# Patient Record
Sex: MALE | Race: WHITE | Employment: FULL TIME | URBAN - METROPOLITAN AREA
[De-identification: names, ages, dates, MRNs, and addresses within clinical notes are randomized per-mention and may not be internally consistent; named-entity substitution may affect disease eponyms.]

---

## 2020-08-20 ENCOUNTER — OFFICE VISIT (OUTPATIENT)
Dept: ORTHOPEDIC SURGERY | Age: 40
End: 2020-08-20
Payer: COMMERCIAL

## 2020-08-20 VITALS — BODY MASS INDEX: 34.65 KG/M2 | WEIGHT: 270 LBS | HEIGHT: 74 IN

## 2020-08-20 PROCEDURE — 99243 OFF/OP CNSLTJ NEW/EST LOW 30: CPT | Performed by: ORTHOPAEDIC SURGERY

## 2020-08-20 PROCEDURE — L1810 KO ELASTIC WITH JOINTS: HCPCS | Performed by: ORTHOPAEDIC SURGERY

## 2020-08-20 NOTE — PROGRESS NOTES
induration. Vascular: Examination reveals no swelling or calf tenderness. Peripheral pulses are palpable and 2+. Neurological: The patient has good coordination. There is no weakness or sensory deficit. Right Knee Examination:    Inspection: Today's inspection of the right lower extremity reveals the skin to be intact with vascular changes with ropey varicosities noted. There is no present joint effusion noted. Palpation: Patient is minimally tender to palpation over the fibular head into the lateral joint line. There is minimal palpable pain noted over the medial or patellofemoral joint on the right knee. Range of Motion: Range of motion is noted to be 0 degrees of extension to at least 120 degrees of flexion. Strength: -5/5 in his quadriceps and hamstrings on the right. Special Tests: Negative Gabriel with a slight limp favoring her right lower extremity    Skin: There are no rashes, ulcerations or lesions. Gait: Minimal limp favoring the right lower extremity    Additional Examinations:         Left Lower Extremity: Examination of the left lower extremity does not show any tenderness, deformity or injury. Range of motion is unremarkable. There is no gross instability. There are no rashes, ulcerations or lesions. Strength and tone are normal.    Radiology:     X-rays obtained and reviewed in office:  Views 4 views to include AP, lateral, tunnel view, sunrise view right knee  Location right knee  Impression there are no acute fractures noted and minimal degenerative changes noted within the medial or lateral compartment. There is minimal right patellofemoral pain. Assessment : Contusion right knee    Impression:  Encounter Diagnoses   Name Primary?     Right knee pain, unspecified chronicity     Contusion of right knee, initial encounter Yes    History of disruption of posterior cruciate ligament        Office Procedures:  Orders Placed This Encounter   Procedures    XR

## 2020-08-20 NOTE — LETTER
[place] [address] [address] [facility] [address]  Phone: [phone]  Fax: [phone]    Quan Renee MD        August 20, 2020     Patient: Ruthann De Luna   YOB: 1980   Date of Visit: 8/20/2020       To Whom It May Concern: It is my medical opinion that Constance Natarajan should remain out of work until 8/28/2020 due to an orthopedic injury. If you have any questions or concerns, please don't hesitate to call.     Sincerely,    8/20/2020 9:55 AM    Quan Renee MD

## 2024-03-05 ENCOUNTER — APPOINTMENT (OUTPATIENT)
Dept: CT IMAGING | Age: 44
End: 2024-03-05
Payer: COMMERCIAL

## 2024-03-05 ENCOUNTER — HOSPITAL ENCOUNTER (INPATIENT)
Age: 44
LOS: 2 days | End: 2024-03-08
Attending: EMERGENCY MEDICINE | Admitting: HOSPITALIST
Payer: COMMERCIAL

## 2024-03-05 ENCOUNTER — ANCILLARY PROCEDURE (OUTPATIENT)
Dept: EMERGENCY DEPT | Age: 44
End: 2024-03-05
Attending: EMERGENCY MEDICINE
Payer: COMMERCIAL

## 2024-03-05 ENCOUNTER — APPOINTMENT (OUTPATIENT)
Dept: GENERAL RADIOLOGY | Age: 44
End: 2024-03-05
Payer: COMMERCIAL

## 2024-03-05 DIAGNOSIS — E87.20 LACTIC ACIDOSIS: ICD-10-CM

## 2024-03-05 DIAGNOSIS — J96.01 ACUTE RESPIRATORY FAILURE WITH HYPOXIA (HCC): ICD-10-CM

## 2024-03-05 DIAGNOSIS — F10.10 ALCOHOL ABUSE: ICD-10-CM

## 2024-03-05 DIAGNOSIS — I46.9 CARDIAC ARREST (HCC): Primary | ICD-10-CM

## 2024-03-05 DIAGNOSIS — S00.01XA ABRASION OF SCALP, INITIAL ENCOUNTER: ICD-10-CM

## 2024-03-05 DIAGNOSIS — S22.43XA MULTIPLE FRACTURES OF RIBS, BILATERAL, INITIAL ENCOUNTER FOR CLOSED FRACTURE: ICD-10-CM

## 2024-03-05 LAB
ALBUMIN SERPL-MCNC: 3.6 G/DL (ref 3.4–5)
ALBUMIN/GLOB SERPL: 1.2 {RATIO} (ref 1.1–2.2)
ALP SERPL-CCNC: 78 U/L (ref 40–129)
ALT SERPL-CCNC: 31 U/L (ref 10–40)
ANION GAP SERPL CALCULATED.3IONS-SCNC: 19 MMOL/L (ref 3–16)
ANISOCYTOSIS BLD QL SMEAR: ABNORMAL
AST SERPL-CCNC: 51 U/L (ref 15–37)
BASE EXCESS BLDV CALC-SCNC: -11.2 MMOL/L (ref -3–3)
BASE EXCESS BLDV CALC-SCNC: -20.8 MMOL/L (ref -3–3)
BASOPHILS # BLD: 0 K/UL (ref 0–0.2)
BASOPHILS # BLD: 0 K/UL (ref 0–0.2)
BASOPHILS NFR BLD: 0 %
BASOPHILS NFR BLD: 0.4 %
BILIRUB SERPL-MCNC: 1.1 MG/DL (ref 0–1)
BUN SERPL-MCNC: 14 MG/DL (ref 7–20)
CALCIUM SERPL-MCNC: 8.2 MG/DL (ref 8.3–10.6)
CHLORIDE SERPL-SCNC: 90 MMOL/L (ref 99–110)
CO2 BLDV-SCNC: 18 MMOL/L
CO2 BLDV-SCNC: 18 MMOL/L
CO2 SERPL-SCNC: 20 MMOL/L (ref 21–32)
COHGB MFR BLDV: 4.8 % (ref 0–1.5)
COHGB MFR BLDV: 8.1 % (ref 0–1.5)
CREAT SERPL-MCNC: 1.2 MG/DL (ref 0.9–1.3)
DEPRECATED RDW RBC AUTO: 14 % (ref 12.4–15.4)
DEPRECATED RDW RBC AUTO: 14.2 % (ref 12.4–15.4)
EOSINOPHIL # BLD: 0.1 K/UL (ref 0–0.6)
EOSINOPHIL # BLD: 0.4 K/UL (ref 0–0.6)
EOSINOPHIL NFR BLD: 0.9 %
EOSINOPHIL NFR BLD: 3 %
ETHANOLAMINE SERPL-MCNC: 237 MG/DL (ref 0–0.08)
GFR SERPLBLD CREATININE-BSD FMLA CKD-EPI: >60 ML/MIN/{1.73_M2}
GLUCOSE SERPL-MCNC: 232 MG/DL (ref 70–99)
HCO3 BLDV-SCNC: 15.4 MMOL/L (ref 23–29)
HCO3 BLDV-SCNC: 17 MMOL/L (ref 23–29)
HCT VFR BLD AUTO: 47 % (ref 40.5–52.5)
HCT VFR BLD AUTO: 47 % (ref 40.5–52.5)
HGB BLD-MCNC: 15.4 G/DL (ref 13.5–17.5)
HGB BLD-MCNC: 15.7 G/DL (ref 13.5–17.5)
INR PPP: 1.17 (ref 0.84–1.16)
LACTATE BLDV-SCNC: 9.2 MMOL/L (ref 0.4–2)
LYMPHOCYTES # BLD: 1.3 K/UL (ref 1–5.1)
LYMPHOCYTES # BLD: 6.2 K/UL (ref 1–5.1)
LYMPHOCYTES NFR BLD: 10.7 %
LYMPHOCYTES NFR BLD: 25 %
MACROCYTES BLD QL SMEAR: ABNORMAL
MAGNESIUM SERPL-MCNC: 3 MG/DL (ref 1.8–2.4)
MCH RBC QN AUTO: 32.7 PG (ref 26–34)
MCH RBC QN AUTO: 32.8 PG (ref 26–34)
MCHC RBC AUTO-ENTMCNC: 32.6 G/DL (ref 31–36)
MCHC RBC AUTO-ENTMCNC: 33.4 G/DL (ref 31–36)
MCV RBC AUTO: 100.6 FL (ref 80–100)
MCV RBC AUTO: 97.9 FL (ref 80–100)
METHGB MFR BLDV: 0.1 %
METHGB MFR BLDV: 0.2 %
MICROCYTES BLD QL SMEAR: ABNORMAL
MONOCYTES # BLD: 0.7 K/UL (ref 0–1.3)
MONOCYTES # BLD: 1 K/UL (ref 0–1.3)
MONOCYTES NFR BLD: 6.3 %
MONOCYTES NFR BLD: 8 %
NEUTROPHILS # BLD: 5.1 K/UL (ref 1.7–7.7)
NEUTROPHILS # BLD: 9.6 K/UL (ref 1.7–7.7)
NEUTROPHILS NFR BLD: 36 %
NEUTROPHILS NFR BLD: 81.7 %
NEUTS BAND NFR BLD MANUAL: 4 % (ref 0–7)
NEUTS VAC BLD QL SMEAR: PRESENT
NT-PROBNP SERPL-MCNC: 99 PG/ML (ref 0–124)
O2 THERAPY: ABNORMAL
O2 THERAPY: ABNORMAL
OVALOCYTES BLD QL SMEAR: ABNORMAL
PATH INTERP BLD-IMP: YES
PCO2 BLDV: 46.1 MMHG (ref 40–50)
PCO2 BLDV: 92.9 MMHG (ref 40–50)
PH BLDV: 6.84 [PH] (ref 7.35–7.45)
PH BLDV: 7.18 [PH] (ref 7.35–7.45)
PLATELET # BLD AUTO: 131 K/UL (ref 135–450)
PLATELET # BLD AUTO: 139 K/UL (ref 135–450)
PLATELET BLD QL SMEAR: ABNORMAL
PMV BLD AUTO: 9.6 FL (ref 5–10.5)
PMV BLD AUTO: 9.8 FL (ref 5–10.5)
PO2 BLDV: 69.2 MMHG (ref 25–40)
PO2 BLDV: 88.4 MMHG (ref 25–40)
POIKILOCYTOSIS BLD QL SMEAR: ABNORMAL
POLYCHROMASIA BLD QL SMEAR: ABNORMAL
POTASSIUM SERPL-SCNC: 3.5 MMOL/L (ref 3.5–5.1)
PROT SERPL-MCNC: 6.5 G/DL (ref 6.4–8.2)
PROTHROMBIN TIME: 14.9 SEC (ref 11.5–14.8)
RBC # BLD AUTO: 4.68 M/UL (ref 4.2–5.9)
RBC # BLD AUTO: 4.81 M/UL (ref 4.2–5.9)
SAO2 % BLDV: 80 %
SAO2 % BLDV: 95 %
SLIDE REVIEW: ABNORMAL
SODIUM SERPL-SCNC: 129 MMOL/L (ref 136–145)
SPHEROCYTES BLD QL SMEAR: ABNORMAL
TROPONIN, HIGH SENSITIVITY: 13 NG/L (ref 0–22)
TROPONIN, HIGH SENSITIVITY: <6 NG/L (ref 0–22)
VARIANT LYMPHS NFR BLD MANUAL: 24 % (ref 0–6)
WBC # BLD AUTO: 11.7 K/UL (ref 4–11)
WBC # BLD AUTO: 12.7 K/UL (ref 4–11)

## 2024-03-05 PROCEDURE — 76937 US GUIDE VASCULAR ACCESS: CPT

## 2024-03-05 PROCEDURE — 74177 CT ABD & PELVIS W/CONTRAST: CPT

## 2024-03-05 PROCEDURE — 85610 PROTHROMBIN TIME: CPT

## 2024-03-05 PROCEDURE — 80053 COMPREHEN METABOLIC PANEL: CPT

## 2024-03-05 PROCEDURE — 6360000004 HC RX CONTRAST MEDICATION: Performed by: EMERGENCY MEDICINE

## 2024-03-05 PROCEDURE — 2500000003 HC RX 250 WO HCPCS: Performed by: EMERGENCY MEDICINE

## 2024-03-05 PROCEDURE — 96375 TX/PRO/DX INJ NEW DRUG ADDON: CPT

## 2024-03-05 PROCEDURE — 96361 HYDRATE IV INFUSION ADD-ON: CPT

## 2024-03-05 PROCEDURE — 82803 BLOOD GASES ANY COMBINATION: CPT

## 2024-03-05 PROCEDURE — 36556 INSERT NON-TUNNEL CV CATH: CPT | Performed by: NURSE PRACTITIONER

## 2024-03-05 PROCEDURE — 80307 DRUG TEST PRSMV CHEM ANLYZR: CPT

## 2024-03-05 PROCEDURE — 0BH17EZ INSERTION OF ENDOTRACHEAL AIRWAY INTO TRACHEA, VIA NATURAL OR ARTIFICIAL OPENING: ICD-10-PCS | Performed by: INTERNAL MEDICINE

## 2024-03-05 PROCEDURE — 71045 X-RAY EXAM CHEST 1 VIEW: CPT

## 2024-03-05 PROCEDURE — 83880 ASSAY OF NATRIURETIC PEPTIDE: CPT

## 2024-03-05 PROCEDURE — 84484 ASSAY OF TROPONIN QUANT: CPT

## 2024-03-05 PROCEDURE — 36556 INSERT NON-TUNNEL CV CATH: CPT

## 2024-03-05 PROCEDURE — 94761 N-INVAS EAR/PLS OXIMETRY MLT: CPT

## 2024-03-05 PROCEDURE — 6360000002 HC RX W HCPCS: Performed by: EMERGENCY MEDICINE

## 2024-03-05 PROCEDURE — 72125 CT NECK SPINE W/O DYE: CPT

## 2024-03-05 PROCEDURE — 85025 COMPLETE CBC W/AUTO DIFF WBC: CPT

## 2024-03-05 PROCEDURE — 96376 TX/PRO/DX INJ SAME DRUG ADON: CPT

## 2024-03-05 PROCEDURE — 82077 ASSAY SPEC XCP UR&BREATH IA: CPT

## 2024-03-05 PROCEDURE — 92950 HEART/LUNG RESUSCITATION CPR: CPT

## 2024-03-05 PROCEDURE — 96374 THER/PROPH/DIAG INJ IV PUSH: CPT

## 2024-03-05 PROCEDURE — 02HV33Z INSERTION OF INFUSION DEVICE INTO SUPERIOR VENA CAVA, PERCUTANEOUS APPROACH: ICD-10-PCS | Performed by: INTERNAL MEDICINE

## 2024-03-05 PROCEDURE — 31500 INSERT EMERGENCY AIRWAY: CPT

## 2024-03-05 PROCEDURE — 99285 EMERGENCY DEPT VISIT HI MDM: CPT

## 2024-03-05 PROCEDURE — 2580000003 HC RX 258: Performed by: EMERGENCY MEDICINE

## 2024-03-05 PROCEDURE — 83605 ASSAY OF LACTIC ACID: CPT

## 2024-03-05 PROCEDURE — 93005 ELECTROCARDIOGRAM TRACING: CPT | Performed by: EMERGENCY MEDICINE

## 2024-03-05 PROCEDURE — 83735 ASSAY OF MAGNESIUM: CPT

## 2024-03-05 PROCEDURE — 70450 CT HEAD/BRAIN W/O DYE: CPT

## 2024-03-05 PROCEDURE — 5A1945Z RESPIRATORY VENTILATION, 24-96 CONSECUTIVE HOURS: ICD-10-PCS | Performed by: INTERNAL MEDICINE

## 2024-03-05 PROCEDURE — 71260 CT THORAX DX C+: CPT

## 2024-03-05 PROCEDURE — 2700000000 HC OXYGEN THERAPY PER DAY

## 2024-03-05 RX ORDER — MIDAZOLAM HYDROCHLORIDE 1 MG/ML
2 INJECTION INTRAMUSCULAR; INTRAVENOUS ONCE
Status: COMPLETED | OUTPATIENT
Start: 2024-03-05 | End: 2024-03-05

## 2024-03-05 RX ORDER — 0.9 % SODIUM CHLORIDE 0.9 %
1000 INTRAVENOUS SOLUTION INTRAVENOUS ONCE
Status: COMPLETED | OUTPATIENT
Start: 2024-03-05 | End: 2024-03-06

## 2024-03-05 RX ORDER — 0.9 % SODIUM CHLORIDE 0.9 %
1000 INTRAVENOUS SOLUTION INTRAVENOUS ONCE
Status: COMPLETED | OUTPATIENT
Start: 2024-03-05 | End: 2024-03-05

## 2024-03-05 RX ORDER — FENTANYL CITRATE 50 UG/ML
50 INJECTION, SOLUTION INTRAMUSCULAR; INTRAVENOUS ONCE
Status: COMPLETED | OUTPATIENT
Start: 2024-03-05 | End: 2024-03-06

## 2024-03-05 RX ORDER — FENTANYL CITRATE 50 UG/ML
50 INJECTION, SOLUTION INTRAMUSCULAR; INTRAVENOUS ONCE
Status: COMPLETED | OUTPATIENT
Start: 2024-03-05 | End: 2024-03-05

## 2024-03-05 RX ORDER — EPINEPHRINE IN SOD CHLOR,ISO 1 MG/10 ML
SYRINGE (ML) INTRAVENOUS DAILY PRN
Status: COMPLETED | OUTPATIENT
Start: 2024-03-05 | End: 2024-03-05

## 2024-03-05 RX ADMIN — SODIUM CHLORIDE 1000 ML: 9 INJECTION, SOLUTION INTRAVENOUS at 22:55

## 2024-03-05 RX ADMIN — IOPAMIDOL 75 ML: 755 INJECTION, SOLUTION INTRAVENOUS at 21:56

## 2024-03-05 RX ADMIN — MIDAZOLAM 2 MG: 1 INJECTION INTRAMUSCULAR; INTRAVENOUS at 21:22

## 2024-03-05 RX ADMIN — SODIUM CHLORIDE 1000 ML: 9 INJECTION, SOLUTION INTRAVENOUS at 20:57

## 2024-03-05 RX ADMIN — SODIUM BICARBONATE 50 MEQ: 84 INJECTION, SOLUTION INTRAVENOUS at 20:34

## 2024-03-05 RX ADMIN — EPINEPHRINE 1 MG: 0.1 INJECTION, SOLUTION ENDOTRACHEAL; INTRACARDIAC; INTRAVENOUS at 20:34

## 2024-03-05 RX ADMIN — FENTANYL CITRATE 50 MCG: 50 INJECTION INTRAMUSCULAR; INTRAVENOUS at 21:15

## 2024-03-05 ASSESSMENT — PULMONARY FUNCTION TESTS
PIF_VALUE: 21
PIF_VALUE: 20

## 2024-03-06 PROBLEM — I46.9 CARDIAC ARREST (HCC): Status: ACTIVE | Noted: 2024-03-06

## 2024-03-06 PROBLEM — R79.89 ELEVATED TROPONIN: Status: ACTIVE | Noted: 2024-03-06

## 2024-03-06 PROBLEM — E87.20 LACTIC ACIDOSIS: Status: ACTIVE | Noted: 2024-03-06

## 2024-03-06 PROBLEM — J96.01 ACUTE RESPIRATORY FAILURE WITH HYPOXIA (HCC): Status: ACTIVE | Noted: 2024-03-06

## 2024-03-06 LAB
AMPHETAMINES UR QL SCN>1000 NG/ML: NORMAL
ANION GAP SERPL CALCULATED.3IONS-SCNC: 12 MMOL/L (ref 3–16)
ANION GAP SERPL CALCULATED.3IONS-SCNC: 14 MMOL/L (ref 3–16)
ANION GAP SERPL CALCULATED.3IONS-SCNC: 14 MMOL/L (ref 3–16)
ANION GAP SERPL CALCULATED.3IONS-SCNC: 9 MMOL/L (ref 3–16)
BARBITURATES UR QL SCN>200 NG/ML: NORMAL
BASE EXCESS BLDA CALC-SCNC: -10 MMOL/L (ref -3–3)
BASE EXCESS BLDA CALC-SCNC: -8.5 MMOL/L (ref -3–3)
BASOPHILS # BLD: 0 K/UL (ref 0–0.2)
BASOPHILS NFR BLD: 0.1 %
BENZODIAZ UR QL SCN>200 NG/ML: NORMAL
BUN SERPL-MCNC: 13 MG/DL (ref 7–20)
BUN SERPL-MCNC: 14 MG/DL (ref 7–20)
BUN SERPL-MCNC: 15 MG/DL (ref 7–20)
BUN SERPL-MCNC: 18 MG/DL (ref 7–20)
CA-I BLD-SCNC: 1.05 MMOL/L (ref 1.12–1.32)
CALCIUM SERPL-MCNC: 7.4 MG/DL (ref 8.3–10.6)
CALCIUM SERPL-MCNC: 7.8 MG/DL (ref 8.3–10.6)
CALCIUM SERPL-MCNC: 8.3 MG/DL (ref 8.3–10.6)
CALCIUM SERPL-MCNC: 8.4 MG/DL (ref 8.3–10.6)
CANNABINOIDS UR QL SCN>50 NG/ML: NORMAL
CHLORIDE SERPL-SCNC: 96 MMOL/L (ref 99–110)
CHLORIDE SERPL-SCNC: 97 MMOL/L (ref 99–110)
CHLORIDE SERPL-SCNC: 98 MMOL/L (ref 99–110)
CHLORIDE SERPL-SCNC: 99 MMOL/L (ref 99–110)
CO2 BLDA-SCNC: 18.3 MMOL/L
CO2 BLDA-SCNC: 19 MMOL/L
CO2 SERPL-SCNC: 17 MMOL/L (ref 21–32)
CO2 SERPL-SCNC: 18 MMOL/L (ref 21–32)
CO2 SERPL-SCNC: 22 MMOL/L (ref 21–32)
CO2 SERPL-SCNC: 23 MMOL/L (ref 21–32)
COCAINE UR QL SCN: NORMAL
COHGB MFR BLDA: 0.4 % (ref 0–1.5)
CREAT SERPL-MCNC: 0.8 MG/DL (ref 0.9–1.3)
CREAT SERPL-MCNC: 0.9 MG/DL (ref 0.9–1.3)
CREAT SERPL-MCNC: 1 MG/DL (ref 0.9–1.3)
CREAT SERPL-MCNC: 1.2 MG/DL (ref 0.9–1.3)
DEPRECATED RDW RBC AUTO: 14 % (ref 12.4–15.4)
DRUG SCREEN COMMENT UR-IMP: NORMAL
EKG ATRIAL RATE: 133 BPM
EKG ATRIAL RATE: 66 BPM
EKG DIAGNOSIS: NORMAL
EKG DIAGNOSIS: NORMAL
EKG P AXIS: 27 DEGREES
EKG P AXIS: 57 DEGREES
EKG P-R INTERVAL: 130 MS
EKG P-R INTERVAL: 194 MS
EKG Q-T INTERVAL: 342 MS
EKG Q-T INTERVAL: 442 MS
EKG QRS DURATION: 102 MS
EKG QRS DURATION: 94 MS
EKG QTC CALCULATION (BAZETT): 463 MS
EKG QTC CALCULATION (BAZETT): 509 MS
EKG R AXIS: 84 DEGREES
EKG R AXIS: 91 DEGREES
EKG T AXIS: 34 DEGREES
EKG T AXIS: 53 DEGREES
EKG VENTRICULAR RATE: 133 BPM
EKG VENTRICULAR RATE: 66 BPM
EOSINOPHIL # BLD: 0 K/UL (ref 0–0.6)
EOSINOPHIL NFR BLD: 0 %
EST. AVERAGE GLUCOSE BLD GHB EST-MCNC: 96.8 MG/DL
FENTANYL SCREEN, URINE: NORMAL
GFR SERPLBLD CREATININE-BSD FMLA CKD-EPI: >60 ML/MIN/{1.73_M2}
GLUCOSE BLD-MCNC: 112 MG/DL (ref 70–99)
GLUCOSE BLD-MCNC: 126 MG/DL (ref 70–99)
GLUCOSE BLD-MCNC: 137 MG/DL (ref 70–99)
GLUCOSE SERPL-MCNC: 129 MG/DL (ref 70–99)
GLUCOSE SERPL-MCNC: 134 MG/DL (ref 70–99)
GLUCOSE SERPL-MCNC: 136 MG/DL (ref 70–99)
GLUCOSE SERPL-MCNC: 145 MG/DL (ref 70–99)
HBA1C MFR BLD: 5 %
HCO3 BLDA-SCNC: 17.1 MMOL/L (ref 21–29)
HCO3 BLDA-SCNC: 17.8 MMOL/L (ref 21–29)
HCT VFR BLD AUTO: 48.3 % (ref 40.5–52.5)
HCT VFR BLD AUTO: 57 % (ref 40.5–52.5)
HGB BLD CALC-MCNC: 19.5 GM/DL (ref 13.5–17.5)
HGB BLD-MCNC: 16.3 G/DL (ref 13.5–17.5)
HGB BLDA-MCNC: 17.4 G/DL (ref 13.5–17.5)
LACTATE BLD-SCNC: 5.59 MMOL/L (ref 0.4–2)
LACTATE BLDV-SCNC: 2.5 MMOL/L (ref 0.4–2)
LACTATE BLDV-SCNC: 4.5 MMOL/L (ref 0.4–2)
LACTATE BLDV-SCNC: 5 MMOL/L (ref 0.4–2)
LACTATE BLDV-SCNC: 6.6 MMOL/L (ref 0.4–2)
LYMPHOCYTES # BLD: 0.5 K/UL (ref 1–5.1)
LYMPHOCYTES NFR BLD: 3.5 %
MAGNESIUM SERPL-MCNC: 1.5 MG/DL (ref 1.8–2.4)
MAGNESIUM SERPL-MCNC: 1.6 MG/DL (ref 1.8–2.4)
MAGNESIUM SERPL-MCNC: 2.3 MG/DL (ref 1.8–2.4)
MCH RBC QN AUTO: 32.9 PG (ref 26–34)
MCHC RBC AUTO-ENTMCNC: 33.8 G/DL (ref 31–36)
MCV RBC AUTO: 97.3 FL (ref 80–100)
METHADONE UR QL SCN>300 NG/ML: NORMAL
METHGB MFR BLDA: 0.3 %
MONOCYTES # BLD: 1.1 K/UL (ref 0–1.3)
MONOCYTES NFR BLD: 6.8 %
NEUTROPHILS # BLD: 13.8 K/UL (ref 1.7–7.7)
NEUTROPHILS NFR BLD: 89.6 %
O2 THERAPY: ABNORMAL
OPIATES UR QL SCN>300 NG/ML: NORMAL
OXYCODONE UR QL SCN: NORMAL
PCO2 BLDA: 36.4 MMHG (ref 35–45)
PCO2 BLDA: 43.9 MM HG (ref 35–45)
PCP UR QL SCN>25 NG/ML: NORMAL
PERFORMED ON: ABNORMAL
PH BLDA: 7.22 [PH] (ref 7.35–7.45)
PH BLDA: 7.29 [PH] (ref 7.35–7.45)
PH UR STRIP: 6 [PH]
PLATELET # BLD AUTO: 136 K/UL (ref 135–450)
PMV BLD AUTO: 9 FL (ref 5–10.5)
PO2 BLDA: 199.6 MM HG (ref 75–108)
PO2 BLDA: 218.9 MMHG (ref 75–108)
POC SAMPLE TYPE: ABNORMAL
POTASSIUM BLD-SCNC: 4.5 MMOL/L (ref 3.5–5.1)
POTASSIUM SERPL-SCNC: 4.6 MMOL/L (ref 3.5–5.1)
POTASSIUM SERPL-SCNC: 4.9 MMOL/L (ref 3.5–5.1)
POTASSIUM SERPL-SCNC: 6.3 MMOL/L (ref 3.5–5.1)
POTASSIUM SERPL-SCNC: 6.5 MMOL/L (ref 3.5–5.1)
RBC # BLD AUTO: 4.96 M/UL (ref 4.2–5.9)
SAO2 % BLDA: 100 % (ref 93–100)
SAO2 % BLDA: 99.5 %
SODIUM BLD-SCNC: 135 MMOL/L (ref 136–145)
SODIUM SERPL-SCNC: 129 MMOL/L (ref 136–145)
SODIUM SERPL-SCNC: 130 MMOL/L (ref 136–145)
TROPONIN, HIGH SENSITIVITY: 35 NG/L (ref 0–22)
WBC # BLD AUTO: 15.4 K/UL (ref 4–11)

## 2024-03-06 PROCEDURE — C8929 TTE W OR WO FOL WCON,DOPPLER: HCPCS

## 2024-03-06 PROCEDURE — 2580000003 HC RX 258: Performed by: HOSPITALIST

## 2024-03-06 PROCEDURE — 94761 N-INVAS EAR/PLS OXIMETRY MLT: CPT

## 2024-03-06 PROCEDURE — 85025 COMPLETE CBC W/AUTO DIFF WBC: CPT

## 2024-03-06 PROCEDURE — 84484 ASSAY OF TROPONIN QUANT: CPT

## 2024-03-06 PROCEDURE — APPNB60 APP NON BILLABLE TIME 46-60 MINS: Performed by: NURSE PRACTITIONER

## 2024-03-06 PROCEDURE — 2700000000 HC OXYGEN THERAPY PER DAY

## 2024-03-06 PROCEDURE — 2580000003 HC RX 258: Performed by: NURSE PRACTITIONER

## 2024-03-06 PROCEDURE — 94002 VENT MGMT INPAT INIT DAY: CPT

## 2024-03-06 PROCEDURE — 2000000000 HC ICU R&B

## 2024-03-06 PROCEDURE — 95816 EEG AWAKE AND DROWSY: CPT | Performed by: PSYCHIATRY & NEUROLOGY

## 2024-03-06 PROCEDURE — 6360000002 HC RX W HCPCS: Performed by: EMERGENCY MEDICINE

## 2024-03-06 PROCEDURE — 6370000000 HC RX 637 (ALT 250 FOR IP): Performed by: NURSE PRACTITIONER

## 2024-03-06 PROCEDURE — 99291 CRITICAL CARE FIRST HOUR: CPT | Performed by: INTERNAL MEDICINE

## 2024-03-06 PROCEDURE — 6360000004 HC RX CONTRAST MEDICATION: Performed by: NURSE PRACTITIONER

## 2024-03-06 PROCEDURE — 82947 ASSAY GLUCOSE BLOOD QUANT: CPT

## 2024-03-06 PROCEDURE — 85014 HEMATOCRIT: CPT

## 2024-03-06 PROCEDURE — 84295 ASSAY OF SERUM SODIUM: CPT

## 2024-03-06 PROCEDURE — 83605 ASSAY OF LACTIC ACID: CPT

## 2024-03-06 PROCEDURE — 83735 ASSAY OF MAGNESIUM: CPT

## 2024-03-06 PROCEDURE — 2500000003 HC RX 250 WO HCPCS: Performed by: NURSE PRACTITIONER

## 2024-03-06 PROCEDURE — 6360000002 HC RX W HCPCS: Performed by: HOSPITALIST

## 2024-03-06 PROCEDURE — 82803 BLOOD GASES ANY COMBINATION: CPT

## 2024-03-06 PROCEDURE — 80048 BASIC METABOLIC PNL TOTAL CA: CPT

## 2024-03-06 PROCEDURE — 83036 HEMOGLOBIN GLYCOSYLATED A1C: CPT

## 2024-03-06 PROCEDURE — 6360000002 HC RX W HCPCS: Performed by: NURSE PRACTITIONER

## 2024-03-06 PROCEDURE — 84132 ASSAY OF SERUM POTASSIUM: CPT

## 2024-03-06 PROCEDURE — 5A12012 PERFORMANCE OF CARDIAC OUTPUT, SINGLE, MANUAL: ICD-10-PCS | Performed by: INTERNAL MEDICINE

## 2024-03-06 PROCEDURE — 6370000000 HC RX 637 (ALT 250 FOR IP): Performed by: INTERNAL MEDICINE

## 2024-03-06 PROCEDURE — 95822 EEG COMA OR SLEEP ONLY: CPT

## 2024-03-06 PROCEDURE — 93010 ELECTROCARDIOGRAM REPORT: CPT | Performed by: INTERNAL MEDICINE

## 2024-03-06 PROCEDURE — C9113 INJ PANTOPRAZOLE SODIUM, VIA: HCPCS | Performed by: NURSE PRACTITIONER

## 2024-03-06 PROCEDURE — 82330 ASSAY OF CALCIUM: CPT

## 2024-03-06 RX ORDER — POTASSIUM CHLORIDE 7.45 MG/ML
10 INJECTION INTRAVENOUS PRN
Status: DISCONTINUED | OUTPATIENT
Start: 2024-03-06 | End: 2024-03-09 | Stop reason: HOSPADM

## 2024-03-06 RX ORDER — ACETAMINOPHEN 650 MG/1
650 SUPPOSITORY RECTAL EVERY 6 HOURS PRN
Status: DISCONTINUED | OUTPATIENT
Start: 2024-03-06 | End: 2024-03-09 | Stop reason: HOSPADM

## 2024-03-06 RX ORDER — ACETAMINOPHEN 325 MG/1
650 TABLET ORAL EVERY 6 HOURS PRN
Status: DISCONTINUED | OUTPATIENT
Start: 2024-03-06 | End: 2024-03-09 | Stop reason: HOSPADM

## 2024-03-06 RX ORDER — MINERAL OIL AND WHITE PETROLATUM 150; 830 MG/G; MG/G
OINTMENT OPHTHALMIC EVERY 6 HOURS
Status: DISCONTINUED | OUTPATIENT
Start: 2024-03-06 | End: 2024-03-09 | Stop reason: HOSPADM

## 2024-03-06 RX ORDER — KETOCONAZOLE 20 MG/G
CREAM TOPICAL DAILY
COMMUNITY

## 2024-03-06 RX ORDER — FUROSEMIDE 10 MG/ML
40 INJECTION INTRAMUSCULAR; INTRAVENOUS ONCE
Status: COMPLETED | OUTPATIENT
Start: 2024-03-06 | End: 2024-03-06

## 2024-03-06 RX ORDER — FENTANYL CITRATE-0.9 % NACL/PF 10 MCG/ML
25-200 PLASTIC BAG, INJECTION (ML) INTRAVENOUS CONTINUOUS
Status: DISCONTINUED | OUTPATIENT
Start: 2024-03-06 | End: 2024-03-09 | Stop reason: HOSPADM

## 2024-03-06 RX ORDER — SODIUM CHLORIDE 0.9 % (FLUSH) 0.9 %
5-40 SYRINGE (ML) INJECTION EVERY 12 HOURS SCHEDULED
Status: DISCONTINUED | OUTPATIENT
Start: 2024-03-06 | End: 2024-03-09 | Stop reason: HOSPADM

## 2024-03-06 RX ORDER — VALSARTAN 160 MG/1
160 TABLET ORAL DAILY
COMMUNITY

## 2024-03-06 RX ORDER — POTASSIUM CHLORIDE 20 MEQ/1
40 TABLET, EXTENDED RELEASE ORAL PRN
Status: DISCONTINUED | OUTPATIENT
Start: 2024-03-06 | End: 2024-03-09 | Stop reason: HOSPADM

## 2024-03-06 RX ORDER — ALBUTEROL SULFATE 90 UG/1
2 AEROSOL, METERED RESPIRATORY (INHALATION) EVERY 4 HOURS PRN
COMMUNITY

## 2024-03-06 RX ORDER — LORAZEPAM 2 MG/ML
2 INJECTION INTRAMUSCULAR
Status: DISCONTINUED | OUTPATIENT
Start: 2024-03-06 | End: 2024-03-09 | Stop reason: HOSPADM

## 2024-03-06 RX ORDER — DEXTROSE MONOHYDRATE 25 G/50ML
25 INJECTION, SOLUTION INTRAVENOUS ONCE
Status: COMPLETED | OUTPATIENT
Start: 2024-03-06 | End: 2024-03-06

## 2024-03-06 RX ORDER — MAGNESIUM SULFATE IN WATER 40 MG/ML
2000 INJECTION, SOLUTION INTRAVENOUS PRN
Status: DISCONTINUED | OUTPATIENT
Start: 2024-03-06 | End: 2024-03-09 | Stop reason: HOSPADM

## 2024-03-06 RX ORDER — PANTOPRAZOLE SODIUM 40 MG/10ML
40 INJECTION, POWDER, LYOPHILIZED, FOR SOLUTION INTRAVENOUS DAILY
Status: DISCONTINUED | OUTPATIENT
Start: 2024-03-06 | End: 2024-03-09 | Stop reason: HOSPADM

## 2024-03-06 RX ORDER — SODIUM CHLORIDE 0.9 % (FLUSH) 0.9 %
5-40 SYRINGE (ML) INJECTION PRN
Status: DISCONTINUED | OUTPATIENT
Start: 2024-03-06 | End: 2024-03-09 | Stop reason: HOSPADM

## 2024-03-06 RX ORDER — ENOXAPARIN SODIUM 100 MG/ML
30 INJECTION SUBCUTANEOUS 2 TIMES DAILY
Status: DISCONTINUED | OUTPATIENT
Start: 2024-03-06 | End: 2024-03-07

## 2024-03-06 RX ORDER — INSULIN GLARGINE 100 [IU]/ML
0.25 INJECTION, SOLUTION SUBCUTANEOUS NIGHTLY
Status: DISCONTINUED | OUTPATIENT
Start: 2024-03-06 | End: 2024-03-09 | Stop reason: HOSPADM

## 2024-03-06 RX ORDER — ONDANSETRON 4 MG/1
4 TABLET, ORALLY DISINTEGRATING ORAL EVERY 8 HOURS PRN
Status: DISCONTINUED | OUTPATIENT
Start: 2024-03-06 | End: 2024-03-09 | Stop reason: HOSPADM

## 2024-03-06 RX ORDER — CHLORHEXIDINE GLUCONATE ORAL RINSE 1.2 MG/ML
15 SOLUTION DENTAL 2 TIMES DAILY
Status: DISCONTINUED | OUTPATIENT
Start: 2024-03-06 | End: 2024-03-09 | Stop reason: HOSPADM

## 2024-03-06 RX ORDER — CALCIUM GLUCONATE 94 MG/ML
2000 INJECTION, SOLUTION INTRAVENOUS ONCE
Status: COMPLETED | OUTPATIENT
Start: 2024-03-06 | End: 2024-03-06

## 2024-03-06 RX ORDER — CALCIUM GLUCONATE 20 MG/ML
2000 INJECTION, SOLUTION INTRAVENOUS ONCE
Status: COMPLETED | OUTPATIENT
Start: 2024-03-06 | End: 2024-03-06

## 2024-03-06 RX ORDER — PROPOFOL 10 MG/ML
5-50 INJECTION, EMULSION INTRAVENOUS CONTINUOUS
Status: DISCONTINUED | OUTPATIENT
Start: 2024-03-06 | End: 2024-03-09 | Stop reason: HOSPADM

## 2024-03-06 RX ORDER — SODIUM CHLORIDE 9 MG/ML
INJECTION, SOLUTION INTRAVENOUS PRN
Status: DISCONTINUED | OUTPATIENT
Start: 2024-03-06 | End: 2024-03-09 | Stop reason: HOSPADM

## 2024-03-06 RX ORDER — CALCIUM GLUCONATE 20 MG/ML
2000 INJECTION, SOLUTION INTRAVENOUS ONCE
Status: DISCONTINUED | OUTPATIENT
Start: 2024-03-06 | End: 2024-03-06

## 2024-03-06 RX ORDER — DEXTROSE MONOHYDRATE 100 MG/ML
INJECTION, SOLUTION INTRAVENOUS CONTINUOUS PRN
Status: DISCONTINUED | OUTPATIENT
Start: 2024-03-06 | End: 2024-03-09 | Stop reason: HOSPADM

## 2024-03-06 RX ORDER — ONDANSETRON 2 MG/ML
4 INJECTION INTRAMUSCULAR; INTRAVENOUS EVERY 6 HOURS PRN
Status: DISCONTINUED | OUTPATIENT
Start: 2024-03-06 | End: 2024-03-09 | Stop reason: HOSPADM

## 2024-03-06 RX ORDER — ATORVASTATIN CALCIUM 20 MG/1
20 TABLET, FILM COATED ORAL DAILY
COMMUNITY

## 2024-03-06 RX ORDER — POLYETHYLENE GLYCOL 3350 17 G/17G
17 POWDER, FOR SOLUTION ORAL DAILY PRN
Status: DISCONTINUED | OUTPATIENT
Start: 2024-03-06 | End: 2024-03-09 | Stop reason: HOSPADM

## 2024-03-06 RX ADMIN — Medication 50 MCG/HR: at 07:03

## 2024-03-06 RX ADMIN — PANTOPRAZOLE SODIUM 40 MG: 40 INJECTION, POWDER, FOR SOLUTION INTRAVENOUS at 08:09

## 2024-03-06 RX ADMIN — LORAZEPAM 2 MG: 2 INJECTION INTRAMUSCULAR; INTRAVENOUS at 10:56

## 2024-03-06 RX ADMIN — PROPOFOL 25 MCG/KG/MIN: 10 INJECTION, EMULSION INTRAVENOUS at 18:38

## 2024-03-06 RX ADMIN — CHLORHEXIDINE GLUCONATE 15 ML: 1.2 RINSE ORAL at 19:22

## 2024-03-06 RX ADMIN — CALCIUM GLUCONATE 2000 MG: 98 INJECTION, SOLUTION INTRAVENOUS at 08:09

## 2024-03-06 RX ADMIN — MAGNESIUM SULFATE HEPTAHYDRATE 2000 MG: 40 INJECTION, SOLUTION INTRAVENOUS at 11:44

## 2024-03-06 RX ADMIN — PROPOFOL 20 MCG/KG/MIN: 10 INJECTION, EMULSION INTRAVENOUS at 01:59

## 2024-03-06 RX ADMIN — FUROSEMIDE 40 MG: 10 INJECTION, SOLUTION INTRAMUSCULAR; INTRAVENOUS at 08:08

## 2024-03-06 RX ADMIN — CHLORHEXIDINE GLUCONATE 15 ML: 1.2 RINSE ORAL at 08:20

## 2024-03-06 RX ADMIN — ENOXAPARIN SODIUM 30 MG: 100 INJECTION SUBCUTANEOUS at 10:23

## 2024-03-06 RX ADMIN — Medication 10 ML: at 19:29

## 2024-03-06 RX ADMIN — WHITE PETROLATUM 57.7 %-MINERAL OIL 31.9 % EYE OINTMENT: at 18:03

## 2024-03-06 RX ADMIN — PROPOFOL 40 MCG/KG/MIN: 10 INJECTION, EMULSION INTRAVENOUS at 10:22

## 2024-03-06 RX ADMIN — FENTANYL CITRATE 50 MCG: 50 INJECTION INTRAMUSCULAR; INTRAVENOUS at 00:32

## 2024-03-06 RX ADMIN — MUPIROCIN: 20 OINTMENT TOPICAL at 08:20

## 2024-03-06 RX ADMIN — PROPOFOL 30 MCG/KG/MIN: 10 INJECTION, EMULSION INTRAVENOUS at 15:13

## 2024-03-06 RX ADMIN — CALCIUM GLUCONATE 2000 MG: 20 INJECTION, SOLUTION INTRAVENOUS at 03:58

## 2024-03-06 RX ADMIN — Medication 10 ML: at 11:02

## 2024-03-06 RX ADMIN — MUPIROCIN: 20 OINTMENT TOPICAL at 19:23

## 2024-03-06 RX ADMIN — SODIUM CHLORIDE 5 MCG/MIN: 9 INJECTION, SOLUTION INTRAVENOUS at 23:32

## 2024-03-06 RX ADMIN — PROPOFOL 35 MCG/KG/MIN: 10 INJECTION, EMULSION INTRAVENOUS at 07:18

## 2024-03-06 RX ADMIN — WHITE PETROLATUM 57.7 %-MINERAL OIL 31.9 % EYE OINTMENT: at 23:16

## 2024-03-06 RX ADMIN — PERFLUTREN 1.5 ML: 6.52 INJECTION, SUSPENSION INTRAVENOUS at 09:26

## 2024-03-06 RX ADMIN — WHITE PETROLATUM 57.7 %-MINERAL OIL 31.9 % EYE OINTMENT: at 12:11

## 2024-03-06 RX ADMIN — LORAZEPAM 2 MG: 2 INJECTION INTRAMUSCULAR; INTRAVENOUS at 11:31

## 2024-03-06 RX ADMIN — INSULIN HUMAN 10 UNITS: 100 INJECTION, SOLUTION PARENTERAL at 08:10

## 2024-03-06 RX ADMIN — Medication 100 MCG/HR: at 15:16

## 2024-03-06 RX ADMIN — DEXTROSE MONOHYDRATE 25 G: 25 INJECTION, SOLUTION INTRAVENOUS at 08:13

## 2024-03-06 RX ADMIN — ENOXAPARIN SODIUM 30 MG: 100 INJECTION SUBCUTANEOUS at 20:34

## 2024-03-06 ASSESSMENT — PULMONARY FUNCTION TESTS
PIF_VALUE: 17
PIF_VALUE: 24
PIF_VALUE: 17
PIF_VALUE: 18
PIF_VALUE: 17
PIF_VALUE: 24
PIF_VALUE: 16
PIF_VALUE: 18
PIF_VALUE: 15
PIF_VALUE: 17
PIF_VALUE: 17
PIF_VALUE: 21
PIF_VALUE: 18
PIF_VALUE: 18
PIF_VALUE: 24
PIF_VALUE: 18
PIF_VALUE: 21
PIF_VALUE: 19
PIF_VALUE: 16
PIF_VALUE: 17
PIF_VALUE: 17
PIF_VALUE: 18
PIF_VALUE: 17
PIF_VALUE: 22
PIF_VALUE: 19
PIF_VALUE: 18
PIF_VALUE: 14
PIF_VALUE: 20
PIF_VALUE: 17
PIF_VALUE: 22
PIF_VALUE: 20
PIF_VALUE: 17

## 2024-03-06 NOTE — CONSULTS
[facility] - INITIAL CONSULTATION        CHIEF COMPLAINT  Cardiac arrest      HISTORY OF PRESENTING ILLNESS  Cezar Arora is a 43 y.o. patient with history of hypertension, asthma, alcohol use, COPD, history of DVT on anticoagulation, tobacco use, sleep apnea and recent cold who presented to the hospital after being found unconscious at home and cardiac arrest.  On initially being found down, his fiancée performed CPR which was taken over by EMS and total CPR time was 20 minutes.  He was brought to the emergency department and intubated.  In the ER he required another 2 minutes of CPR.  No reports of shockable rhythm or defibrillation.  According to the partner, there were no complaints by him of unusual health problems in the recent days or hours leading up to this event including chest pain, shortness of breath or other cardiac symptoms.  He did have upper respiratory infection 2 weeks ago for which he was prescribed oral antibiotics likely for 10 days and had finished by now.  He remains intubated in ICU with no purposeful movements and no response to commands despite weaning sedation.  Review of outside records reports that a recent set of labs including renal panel and electrolytes as well as liver function was normal 4 days ago.      PAST MEDICAL HISTORY   has a past medical history of Asthma, High blood pressure, Mononucleosis, and Sleep apnea.    SURGICAL HISTORY   has a past surgical history that includes other surgical history and Cholecystectomy (6/10/16).     SOCIAL HISTORY   reports that he has been smoking. He has quit using smokeless tobacco. He reports current alcohol use. He reports that he does not use drugs.     FAMILY HISTORY  No family history of premature coronary artery disease, aortic disease, or valve disease.    HOME CARDIAC MEDICATIONS  Valsartan 160  Eliquis 5 twice daily  Lipitor 20  Aspirin 325 daily      ALLERGIES  Lisinopril and Naproxen     REVIEW OF SYSTEMS  14 point ROS  low.    Await neurology evaluation and prognostication  If he has meaningful neurologic recovery, will consider ischemia evaluation with a coronary angiogram later this admission  Will consider EP consult for ICD evaluation although no reports of shockable rhythm      All questions and concerns were addressed to the patient/family. Alternatives to my treatment as well as risks and benefits of proposed treatment were discussed.     Due to the high probability of clinically significant life threating deterioration of the patient's condition that required my urgent intervention, a total critical care time of 55 minutes was used. This time excludes any time that may have been spent performing procedures. This includes but not limited to vital sign monitoring, telemetry monitoring, continuous pulse oximety, IV medication, clinical response to the IV medications, documentation time , consultation time, interpretation of lab data, review of nursing notes and old record review.       Thank you for allowing to us to participate in the care or Cezar Arora. Please call with questions.         Kodi Silva MD, FACC, [facility]  Interventional Cardiology  [facility]  3/6/2024  [phone]      Inadvertent computerized transcription errors may be present     Statement Selected

## 2024-03-06 NOTE — PROGRESS NOTES
03/06/24 0737   Patient Observation   Pulse 88   Respirations (!) 36   SpO2 100 %   Vent Information   Vent Mode AC/VC   Ventilator Settings   FiO2  60 %   Vt (Set, mL) 450 mL   Resp Rate (Set) 20 bpm   PEEP/CPAP (cmH2O) 5   Vent Patient Data (Readings)   Vt (Measured) 469 mL   Peak Inspiratory Pressure (cmH2O) 17 cmH2O   Rate Measured 36 br/min   Minute Volume (L/min) 17.2 Liters   Mean Airway Pressure (cmH2O) 8 cmH20   Plateau Pressure (cm H2O) 0 cm H2O   Driving Pressure -5   I:E Ratio 1.00:1   Backup Apnea On   Backup Rate 20 Breaths Per Minute   Backup Vt 450   Vent Alarm Settings   High Pressure (cmH2O) 40 cmH2O   Low Minute Volume (lpm) 2 L/min   Low Exhaled Vt (ml) 200 mL   High Exhaled Vt (ml) 1000 mL   RR High (bpm) 45 br/min   Additional Respiratoray Assessments   Humidification Source HME   Ambu Bag With Mask At Bedside Yes   Airway Clearance   Suction ET Tube   Suction Device Inline suction catheter   Sputum Method Obtained Endotracheal   Sputum Amount Scant   Sputum Color/Odor Clear   Sputum Consistency Thin   ETT    Placement Date/Time: 03/05/24 2033   Present on Admission/Arrival: No  Placed By: In ED  Location: Oral   Secured At 25 cm   Measured From Lips   ETT Placement Center   Secured By Commercial tube kenny   Cuff Pressure 30 cm H2O   B: Both Spontaneous Awakening and Breathing Trials   Was Patient Receiving Mechanical Ventilation Yes   Safety Screening Spontaneous Breathing Trial (SBT) FIO2 is greater than 50%

## 2024-03-06 NOTE — PROGRESS NOTES
03/05/24 2308   Patient Observation   Pulse 71   Respirations 23   SpO2 97 %   Breath Sounds   Right Upper Lobe Clear   Right Middle Lobe Clear   Right Lower Lobe Diminished   Left Upper Lobe Clear   Left Lower Lobe Clear   Vent Information   Vent Mode AC/VC   Ventilator Settings   FiO2  100 %   Vt (Set, mL) 450 mL   Resp Rate (Set) 20 bpm   PEEP/CPAP (cmH2O) 5   Vent Patient Data (Readings)   Vt (Measured) 499 mL   Peak Inspiratory Pressure (cmH2O) 20 cmH2O   Rate Measured 21 br/min   Minute Volume (L/min) 10 Liters   I:E Ratio 1:3.1   Vent Alarm Settings   High Pressure (cmH2O) 40 cmH2O   Low Minute Volume (lpm) 2 L/min   High Minute Volume (lpm) 24 L/min   Low Exhaled Vt (ml) 200 mL   RR High (bpm) 40 br/min   Apnea (secs) 20 secs   Additional Respiratoray Assessments   Humidification Source HME   Ambu Bag With Mask At Bedside Yes   Airway Clearance   Subglottic Suction Done No   ETT    Placement Date/Time: 03/05/24 2033   Present on Admission/Arrival: No  Placed By: In ED  Location: Oral   Secured At 24 cm   Measured From Lips   ETT Placement Right   Secured By Commercial tube kenny   Site Assessment Dry   Cuff Pressure 30 cm H2O

## 2024-03-06 NOTE — ED NOTES
Mr. Arora is a 43 y.o. male who had concerns including Cardiac Arrest.    Chief Complaint   Patient presents with    Cardiac Arrest       He is being admitted for:    Cardiac arrest (HCC)    His ED problem list included:    1. Cardiac arrest (HCC)        Past Medical History:   Diagnosis Date    Asthma     High blood pressure     Mononucleosis 5/2016    Sleep apnea        Past Surgical History:   Procedure Laterality Date    CHOLECYSTECTOMY  6/10/16    LAPAROSCOPIC CHOLECYSTECTOMY WITH CHOLANGIOGRAM    OTHER SURGICAL HISTORY      vein in testicle       His recent abnormal labs were:    Labs Reviewed   CBC WITH AUTO DIFFERENTIAL - Abnormal; Notable for the following components:       Result Value    WBC 12.7 (*)     .6 (*)     Lymphocytes Absolute 6.2 (*)     Atypical Lymphocytes Relative 24 (*)     Vacuolated Neutrophils Present (*)     Anisocytosis 1+ (*)     Macrocytes Occasional (*)     Microcytes Occasional (*)     Polychromasia Occasional (*)     Poikilocytes Occasional (*)     Ovalocytes Occasional (*)     Spherocytes Occasional (*)     All other components within normal limits   COMPREHENSIVE METABOLIC PANEL - Abnormal; Notable for the following components:    Sodium 129 (*)     Chloride 90 (*)     CO2 20 (*)     Anion Gap 19 (*)     Glucose 232 (*)     Calcium 8.2 (*)     Total Bilirubin 1.1 (*)     AST 51 (*)     All other components within normal limits   LACTIC ACID - Abnormal; Notable for the following components:    Lactic Acid 9.2 (*)     All other components within normal limits    Narrative:     CALL  [facility]  [facility] tel. [phone],  Chemistry results called to and read back by Palmira Mayers RN, 03/05/2024  21:34, by DIAN   BLOOD GAS, VENOUS - Abnormal; Notable for the following components:    pH, Michael 6.838 (*)     pCO2, Michael 92.9 (*)     pO2, Michael 69.2 (*)     HCO3, Venous 15.4 (*)     Base Excess, Michael -20.8 (*)     Carboxyhemoglobin 8.1 (*)     All other components within normal limits    Narrative:

## 2024-03-06 NOTE — H&P
History and Physical      Name:  Cezar Arora /Age/Sex: 1980  (43 y.o. male)   MRN & CSN:  [id number] & [id number] Encounter Date/Time: 3/6/2024 2:29 AM EST   Location:   PCP: Bucky De La Rosa PA-C       Hospital Day: 2    History of Present Illness:     Chief Complaint: Cardiac arrest  Cezar Arora is a 43 y.o. male with pmh of hypertension and hyperlipidemia who was brought into the emergency room via EMS after a cardiac arrest.  History obtained by the patient's fiancé at the bedside reports that he had endorsed a left flank cramping earlier in the day but did not think much of it.  The patient had not endorsed any additional complaints to his family.  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  The patient's fiancé noticed that he was gray and not breathing and their wife started CPR.  Upon EMS arrival, the patient was noted to be in cardiac arrest and roughly 20 minutes of CPR was performed.  Upon arrival at the emergency room, the patient again was noted to be pulseless and further CPR was performed for roughly 2 minutes.  The patient was intubated and admitted to the medical ICU.    In the emergency room CT head and cervical spine was concerning for diffuse anoxic brain injury with a posterior scalp hematoma noted.  CT chest revealed minimally displaced anterior rib fractures involving the third to the seventh ribs on the right and third to the sixth rib on the left.  CT abdomen and pelvis with contrast revealed no abnormalities.  EKG revealed sinus rhythm at 66 bpm with T wave flattening in lead II but otherwise no acute abnormalities.  Urine toxicology was negative with WBC count 12.7, glucose 232, lactic acid 9.2, sodium 129, chloride 90, and CO2 20.    ED documentation reviewed and case discussed with provider.     Assessment and Plan:     Assessment:  Cardiac arrest, unclear etiology  Lactic acidosis, likely due to  was performed without the administration of intravenous contrast. Multiplanar reformatted images are provided for review. Automated exposure control, iterative reconstruction, and/or weight based adjustment of the mA/kV was utilized to reduce the radiation dose to as low as reasonably achievable. COMPARISON: None. HISTORY: ORDERING SYSTEM PROVIDED HISTORY: head trauma TECHNOLOGIST PROVIDED HISTORY: If patient is on cardiac monitor and/or pulse ox, they may be taken off cardiac monitor and pulse ox, left on O2 if currently on. All monitors reattached when patient returns to room. Has a \"code stroke\" or \"stroke alert\" been called?->No Reason for exam:->head trauma Decision Support Exception - unselect if not a suspected or confirmed emergency medical condition->Emergency Medical Condition (MA) Reason for Exam: cardiac arrest; ORDERING SYSTEM PROVIDED HISTORY: head trauma TECHNOLOGIST PROVIDED HISTORY: Reason for exam:->head trauma Decision Support Exception - unselect if not a suspected or confirmed emergency medical condition->Emergency Medical Condition (MA) Reason for Exam: cardiac arrest FINDINGS: HEAD: BRAIN/VENTRICLES: There is no acute intracranial hemorrhage, mass effect or midline shift. No abnormal extra-axial fluid collection.  Poor gray-white differentiation is noted in the cerebral hemispheres, there is no evidence of hydrocephalus. ORBITS: The visualized portion of the orbits demonstrate no acute abnormality. SINUSES: The visualized paranasal sinuses and mastoid air cells demonstrate no acute abnormality. SOFT TISSUES/SKULL:  Right posterior scalp hematoma.  No underlying fracture identified. CERVICAL SPINE: BONES/ALIGNMENT: Motion degraded exam.  There is no evidence of an acute cervical spine fracture. No traumatic malalignment. DEGENERATIVE CHANGES: No significant degenerative changes. SOFT TISSUES: There is no prevertebral soft tissue swelling.  Endotracheal and enteric tubes course off the field of  view.     1.  No acute intracranial hemorrhage identified.  Suspect loss of gray-white differentiation for which diffuse anoxic injury should be considered. 2.  Posterior scalp hematoma without underlying fracture identified. 3.  Cervical spine exam is degraded by motion artifact.  No convincing evidence for acute fracture or traumatic malalignment. Findings were discussed with KALYAN SANTIZO at 10:22 pm on 3/5/2024.     XR CHEST PORTABLE    Result Date: 3/5/2024  EXAMINATION: ONE XRAY VIEW OF THE CHEST 3/5/2024 8:55 pm COMPARISON: None. HISTORY: ORDERING SYSTEM PROVIDED HISTORY: SOB TECHNOLOGIST PROVIDED HISTORY: Reason for exam:->SOB Reason for Exam: arrest FINDINGS: Endotracheal tube is above the brent.  NG tube is in the stomach.  The lungs are diffusely under expanded.  Defibrillator pads are in place.  Patchy opacity in the right infrahilar region.     Endotracheal tube and NG tube are in satisfactory position         Electronically signed by Jaycee Myles MD on 3/6/2024 at 2:29 AM

## 2024-03-06 NOTE — PROGRESS NOTES
Comprehensive Nutrition Assessment    Type and Reason for Visit:  Initial    Nutrition Recommendations/Plan:   NPO     If unable to advance po diet and medically feasible recommend TF initiation. Order: \"Diet: Tube feed continuous/ NPO\".  Initiate Vital HP (peptide based high protein formula) at 10 mL/hr and as tolerated, increase by 10 mL/hr q 4 hours until goal of 30 mL/hr is met via N/G  Recommend 30 mL H20 flush q 4 hours.  Monitor IVF infusion, Na labs and need for adjustments in water flush  Recommend 2 Bottles Proteinex P2Go daily via syringe. Flush with 30 mL H20 before and after  Monitor TF tolerance (abd distention, bowel habits, N/V, cramping)  Check TG while on diprivan infusion  Monitor nutrition adequacy, pertinent labs, bowel habits, wt changes, and clinical progress     Malnutrition Assessment:  Malnutrition Status:  At risk for malnutrition (Comment) (03/06/24 1050)    Context:  Acute Illness     Findings of the 6 clinical characteristics of malnutrition:  Energy Intake:  Mild decrease in energy intake (Comment)    Nutrition Assessment:    Initial: 44 yo M w pmh HLD, HTN admitted with cardiac arrest. Pt intubated 3/5, new vent. Sedated on fentanyl and propofol at 30.5 ml/hr to provide 805 kcals from lipids. No weight loss noted in past month per EMR. Discussed starting trophic feeds today during rounds, however, pt with 1500 ml NG output today so holding off on feeds for now per nurse. TF recommendations provided as needed. Will monitor.    Nutrition Related Findings:    NG to suction. +active BS. Na 130. K 6.3. Mg 1.6. Wound Type: None       Current Nutrition Intake & Therapies:    Average Meal Intake: NPO  Average Supplements Intake: NPO  Diet NPO  Current Tube Feeding (TF) Orders:  Feeding Route: Nasogastric  Formula: Peptide Based High Protein  Schedule: Continuous  Goal TF & Flush Orders Provides: Vital HP at goal rate of 30 ml/hr x20 hrs to provide 600 ml TV, 600 kcals, 52 g protein, 502 ml  free water. +2 bottles proteinex daily to provide additional 52 g protein and 208 kcals. Water flushes 30 ml q 4 hrs. Rate may change d/t propofol titrations.    Anthropometric Measures:  Height: 188 cm (6' 2.02\")  Ideal Body Weight (IBW): 190 lbs (86 kg)       Current Body Weight: 127 kg (279 lb 15.8 oz),   IBW. Weight Source: Bed Scale  Current BMI (kg/m2): 35.9  Usual Body Weight: 124.3 kg (274 lb 0.5 oz) (2/2/2024)  % Weight Change (Calculated): 2.2                    BMI Categories: Obese Class 2 (BMI 35.0 -39.9)    Estimated Daily Nutrient Needs:  Energy Requirements Based On: Kcal/kg (12-15)  Weight Used for Energy Requirements: Current (127)  Energy (kcal/day): 3005-2168 kcals  Weight Used for Protein Requirements: Ideal (1.2-2)  Protein (g/day): 103-172 g  Method Used for Fluid Requirements: 1 ml/kcal  Fluid (ml/day):      Nutrition Diagnosis:   Inadequate oral intake related to inadequate protein-energy intake, impaired respiratory function as evidenced by NPO or clear liquid status due to medical condition, intubation    Nutrition Interventions:   Food and/or Nutrient Delivery: Continue NPO  Nutrition Education/Counseling: No recommendation at this time  Coordination of Nutrition Care: Continue to monitor while inpatient, Interdisciplinary Rounds       Goals:     Goals: Meet at least 75% of estimated needs, prior to discharge       Nutrition Monitoring and Evaluation:   Behavioral-Environmental Outcomes: None Identified  Food/Nutrient Intake Outcomes: None Identified  Physical Signs/Symptoms Outcomes: Biochemical Data, Nutrition Focused Physical Findings, Weight, GI Status    Discharge Planning:    Too soon to determine     Mel Zapata RD  Contact: Office: [phone]; [place]: [phone]

## 2024-03-06 NOTE — PROGRESS NOTES
03/06/24 0323   Patient Observation   Pulse 69   Respirations 25   SpO2 100 %   Breath Sounds   Right Upper Lobe Clear   Right Middle Lobe Clear   Right Lower Lobe Diminished   Left Upper Lobe Clear   Left Lower Lobe Diminished   Vent Information   Vent Mode AC/VC   Ventilator Settings   FiO2  60 %   Vt (Set, mL) 450 mL   Resp Rate (Set) 20 bpm   PEEP/CPAP (cmH2O) 5   Vent Patient Data (Readings)   Vt (Measured) 446 mL   Peak Inspiratory Pressure (cmH2O) 17 cmH2O   Rate Measured 25 br/min   Minute Volume (L/min) 11.4 Liters   Mean Airway Pressure (cmH2O) 9.5 cmH20   Plateau Pressure (cm H2O) 0 cm H2O   Driving Pressure -5   I:E Ratio 1:1.30   Vent Alarm Settings   High Pressure (cmH2O) 40 cmH2O   Low Minute Volume (lpm) 2 L/min   Low Exhaled Vt (ml) 200 mL   High Exhaled Vt (ml) 1000 mL   RR High (bpm) 45 br/min   Additional Respiratoray Assessments   Humidification Source HME   ETT    Placement Date/Time: 03/05/24 2033   Present on Admission/Arrival: No  Placed By: In ED  Location: Oral   Secured At 25 cm   Measured From Lips   ETT Placement Left   Secured By Commercial tube kenny   Cuff Pressure 32 cm H2O

## 2024-03-06 NOTE — CARE COORDINATION
Case Management Assessment  Initial Evaluation    Date/Time of Evaluation: 3/6/2024 2:45 PM  Assessment Completed by: Laura Fishman RN    If patient is discharged prior to next notation, then this note serves as note for discharge by case management.    Patient Name: Cezar Arora                   YOB: 1980  Diagnosis: Cardiac arrest (HCC) [I46.9]                   Date / Time: 3/5/2024  8:36 PM    Patient Admission Status: Inpatient   Readmission Risk (Low < 19, Mod (19-27), High > 27): Readmission Risk Score: 7.8    Current PCP: Bucky De La Rosa PA-C  PCP verified by CM? No    Chart Reviewed: Yes      History Provided by: Medical Record  Patient Orientation: Unresponsive, Sedated    Patient Cognition: Other (see comment) (intubated/sedated)    Hospitalization in the last 30 days (Readmission):  No    If yes, Readmission Assessment in CM Navigator will be completed.    Advance Directives:      Code Status: Full Code   Patient's Primary Decision Maker is: Legal Next of Kin      Discharge Planning:    Patient lives with: Spouse/Significant Other, Children Type of Home: House  Primary Care Giver: Self  Patient Support Systems include: Spouse/Significant Other, Children, Family Members   Current Financial resources: None  Current community resources: None  Current services prior to admission: None            Current DME:              Type of Home Care services:  None    ADLS  Prior functional level: Independent in ADLs/IADLs  Current functional level:      PT AM-PAC:   /24  OT AM-PAC:   /24    Family can provide assistance at DC: Yes  Would you like Case Management to discuss the discharge plan with any other family members/significant others, and if so, who? Yes (Aster)  Plans to Return to Present Housing: Unknown at present  Other Identified Issues/Barriers to RETURNING to current housing: unknown at this time  Potential Assistance needed at discharge: Other (Comment) (unknown)            Potential  DME:    Patient expects to discharge to: Unknown  Plan for transportation at discharge: Other (see comment) (tbd)    Financial    Payor: [employer]ET[employer] / Plan: [employer]ET[employer] / Product Type: *No Product type* /     Does insurance require precert for SNF: Yes    Potential assistance Purchasing Medications: No  Meds-to-Beds request: Yes      [facility] - [place], [place] - [address] [facility] [address] - P [phone] - F [phone]  [address] [address]  Phone: [phone] Fax: [phone]      Notes:    Factors facilitating achievement of predicted outcomes: Family support    Barriers to discharge: Medical complications    Additional Case Management Notes: Pt admitted with cardiac arrest. Needs and plan are TBD at this time. Will follow     The Plan for Transition of Care is related to the following treatment goals of Cardiac arrest (HCC) [I46.9]    IF APPLICABLE: The Patient and/or patient representative Cezar and his family were provided with a choice of provider and agrees with the discharge plan. Freedom of choice list with basic dialogue that supports the patient's individualized plan of care/goals and shares the quality data associated with the providers was provided to: Patient Representative   Patient Representative Name: Aster Marie     The Patient and/or Patient Representative Agree with the Discharge Plan? Yes    Laura Fishman RN  Case Management Department   Phone [phone]

## 2024-03-06 NOTE — PROGRESS NOTES
HOSPITAL MEDICINE  - BRIEF NOTE    Seen by my colleague today. History/record reviewed, patient seen and examined.    Assessment :  1.  Cardiac arrest unknown etiology   2.  Lactic acidosis   3.  Bilateral rib fractures secondary to CPR.  4.  Hypertension  5.  Hyperlipidemia  6.  Acute hypoxic respiratory failure secondary to cardiac arrest.  Ventilatory support per pulmonary.  7.  Possible seizures.      TAM CAMPOS MD  3/6/2024  2:16 PM

## 2024-03-06 NOTE — PROGRESS NOTES
03/06/24 0128   Patient Observation   SpO2 100 %   Breath Sounds   Right Upper Lobe Clear   Right Middle Lobe Diminished   Right Lower Lobe Diminished   Left Upper Lobe Clear   Left Lower Lobe Diminished   Vent Information   Vent Mode AC/VC   $Ventilation $Initial Day   Ventilator Settings   FiO2  70 %   Vt (Set, mL) 450 mL   Resp Rate (Set) 20 bpm   PEEP/CPAP (cmH2O) 5   Vent Patient Data (Readings)   Vt (Measured) 519 mL   Peak Inspiratory Pressure (cmH2O) 14 cmH2O   Rate Measured 29 br/min   Minute Volume (L/min) 13.9 Liters   Mean Airway Pressure (cmH2O) 6.9 cmH20   Plateau Pressure (cm H2O) 0 cm H2O   Driving Pressure -5   I:E Ratio 1:1.90   Vent Alarm Settings   High Pressure (cmH2O) 40 cmH2O   Low Minute Volume (lpm) 2 L/min   High Minute Volume (lpm) 24 L/min   Low Exhaled Vt (ml) 200 mL   RR High (bpm) 45 br/min   Apnea (secs) 20 secs   Additional Respiratoray Assessments   Humidification Source HME   Ambu Bag With Mask At Bedside Yes   Airway Clearance   Suction ET Tube   Subglottic Suction Done Yes   Suction Device Inline suction catheter   Sputum Method Obtained Endotracheal   Sputum Amount Scant   Sputum Color/Odor Clear   ETT    Placement Date/Time: 03/05/24 2033   Present on Admission/Arrival: No  Placed By: In ED  Location: Oral   Secured At 25 cm   Measured From Lips   ETT Placement Center   Secured By Commercial tube kenny   Site Assessment Dry   Cuff Pressure 30 cm H2O

## 2024-03-06 NOTE — CONSULTS
PULMONARY AND CRITICAL CARE INPATIENT NOTE        Cezar Arora   : 1980  MRN: [id number]     Admitting Physician: Jaycee Myles MD  Attending Physician: Luc Willard MD  PCP: Bucky De La Rosa PA-C    Admission: 3/5/2024   Date of Service: 3/6/2024    Chief Complaint   Patient presents with    Cardiac Arrest           ASSESSMENT & PLAN       43 y.o. pleasant  male patient with:    Hospital Problems             Last Modified POA    * (Principal) Cardiac arrest (HCC) 3/6/2024 Yes        # Out of hospital cardiac arrest.  PEA.  Precipitating event under evaluation.  Negative CTA chest.  No STEMI on EKG. no dissection.  Negative initial troponin.  Total resuscitation time 20 to 25 minutes  # Acute encephalopathy.  Suspected next brain injury  # Rhythmic mouth movement concerning for possible seizures  # Severe combined acute metabolic and respiratory acidosis.  # Lactic acidosis, severe  # MARIA TERESA  # Hyperkalemia  # Untreated sleep apnea.  Could not tolerate CPAP or BiPAP therapy  # Childhood asthma.  Bronchitis currently with recent exacerbation and persistent cough  # Polycythemia.  Had phlebotomy lately.  Likely secondary of untreated sleep apnea  # Hypertension since his 20s on blood pressure medications  # Tobacco abuse more than 25-pack-year smoking history still smoking  # History of lung nodules followed at [facility]  # History of unprovoked DVT on Eliquis  Ventilator settings reviewed and adjusted for lung protective ventilation  Continue sedation and titrate for ventilator synchrony  Propofol held temporarily for EEG  Neurology consult  Will repeat brain imaging based on neurology recommendations and clinical exam  Cardiology consult  Follow-up echocardiogram  Repeat lactic acid, chemistry and troponin  Will resume heparin drip today without a bolus.  Patient on chronic apixaban for unprovoked DVT.  No evidence of PE.  Targeted temperature management below 37  Electrolyte replacement  Start  process.     CT ABDOMEN PELVIS W IV CONTRAST Additional Contrast? None    Result Date: 3/5/2024  1. No evidence for central pulmonary embolism or acute pulmonary process. 2. Minimally displaced anterior rib fractures bilaterally involving the 3rd-7th ribs on the right and 3rd-6th ribs on the left.  Favor motion artifact involving the sternum versus minimally displaced fracture. 3. No acute intra-abdominal or pelvic process.     CT HEAD WO CONTRAST    Result Date: 3/5/2024  1.  No acute intracranial hemorrhage identified.  Suspect loss of gray-white differentiation for which diffuse anoxic injury should be considered. 2.  Posterior scalp hematoma without underlying fracture identified. 3.  Cervical spine exam is degraded by motion artifact.  No convincing evidence for acute fracture or traumatic malalignment. Findings were discussed with KALYAN SANTIZO at 10:22 pm on 3/5/2024.     CT CERVICAL SPINE WO CONTRAST    Result Date: 3/5/2024  1.  No acute intracranial hemorrhage identified.  Suspect loss of gray-white differentiation for which diffuse anoxic injury should be considered. 2.  Posterior scalp hematoma without underlying fracture identified. 3.  Cervical spine exam is degraded by motion artifact.  No convincing evidence for acute fracture or traumatic malalignment. Findings were discussed with KALYAN SANTIZO at 10:22 pm on 3/5/2024.     XR CHEST PORTABLE    Result Date: 3/5/2024  Endotracheal tube and NG tube are in satisfactory position          PFTS:  NA      Cardiology:  Lab Results   Component Value Date    LVEF 55 11/21/2016         Sleep:  NA    Labs:   Recent Labs     03/06/24  0144 03/06/24  0554   PHART 7.216* 7.291*   DQK2FCT 43.9 36.4   PO2ART 199.6* 218.9*       Recent Labs     03/05/24  2100 03/05/24  2258 03/06/24  0144 03/06/24  0443   WBC 12.7* 11.7*  --  15.4*   HGB 15.4 15.7 19.5* 16.3   HCT 47.0 47.0  --  48.3   .6* 97.9  --  97.3    131*  --  136       Recent Labs      [phone]

## 2024-03-06 NOTE — PROGRESS NOTES
03/06/24 1530   Patient Observation   Pulse 90   Respirations (!) 34   SpO2 98 %   Vent Information   Vent Mode AC/VC   Ventilator Settings   FiO2  40 %   Vt (Set, mL) 450 mL   Resp Rate (Set) 20 bpm   PEEP/CPAP (cmH2O) 5   Vent Patient Data (Readings)   Vt (Measured) 479 mL   Peak Inspiratory Pressure (cmH2O) 21 cmH2O   Rate Measured 35 br/min   Minute Volume (L/min) 16.1 Liters   Mean Airway Pressure (cmH2O) 12 cmH20   Plateau Pressure (cm H2O) 0 cm H2O   Driving Pressure -5   I:E Ratio 1:1.10   Vent Alarm Settings   High Pressure (cmH2O) 40 cmH2O   Low Minute Volume (lpm) 2 L/min   RR High (bpm) 45 br/min   Additional Respiratoray Assessments   Humidification Source HME   Ambu Bag With Mask At Bedside Yes   Airway Clearance   Suction   (not indicated)   ETT    Placement Date/Time: 03/05/24 2033   Present on Admission/Arrival: No  Placed By: In ED  Location: Oral   Secured At 25 cm   Measured From Lips   ETT Placement Center   Secured By Commercial tube kenny   Site Assessment Dry   Cuff Pressure 30 cm H2O

## 2024-03-06 NOTE — PROGRESS NOTES
03/06/24 1139   Vent Information   Vent Mode AC/VC   Vent Patient Data (Readings)   Backup Apnea On   Backup Rate 20 Breaths Per Minute   Backup Vt 450   Vent Alarm Settings   Low Exhaled Vt (ml) 200 mL   High Exhaled Vt (ml) 1000 mL   Additional Respiratoray Assessments   Ambu Bag With Mask At Bedside Yes   ETT    Placement Date/Time: 03/05/24 2033   Present on Admission/Arrival: No  Placed By: In ED  Location: Oral   Secured At 25 cm   Measured From Lips   ETT Placement Right   Secured By Commercial tube kenny

## 2024-03-06 NOTE — PROGRESS NOTES
Patient came in to the ICU at 0135. ABGs and labs drawn on epoc.    pH = 7.216  PCO2 = 43.9  PO2 = 199.6  HCO3 = 17.8  Base Excess = -10.0  Sat = 99.5    Na = 135  K = 4.5  Glucose = 126  Lactic Acid = 5.59

## 2024-03-06 NOTE — PROGRESS NOTES
03/05/24 2051   Patient Observation   SpO2 100 %   ETCO2 (mmHg) 66 mmHg   Breath Sounds   Right Upper Lobe Diminished   Right Middle Lobe Diminished   Right Lower Lobe Diminished   Left Upper Lobe Diminished   Left Lower Lobe Diminished   Vent Information   Vent Mode AC/VC   Ventilator Settings   FiO2  100 %   Vt (Set, mL) 450 mL   Resp Rate (Set) 20 bpm   PEEP/CPAP (cmH2O) 5   Vent Patient Data (Readings)   Vt (Measured) 472 mL   Peak Inspiratory Pressure (cmH2O) 21 cmH2O   Rate Measured 23 br/min   Minute Volume (L/min) 11.1 Liters   I:E Ratio 1:2.5   Vent Alarm Settings   High Pressure (cmH2O) 40 cmH2O   Low Minute Volume (lpm) 2 L/min   High Minute Volume (lpm) 25 L/min   Low Exhaled Vt (ml) 200 mL   RR High (bpm) 50 br/min   Apnea (secs) 20 secs   Additional Respiratoray Assessments   Humidification Source HME   Ambu Bag With Mask At Bedside Yes   Airway Clearance   Suction ET Tube   Subglottic Suction Done Yes   Suction Device Inline suction catheter   Sputum Method Obtained Endotracheal   Sputum Amount Scant   ETT    Placement Date/Time: 03/05/24 2033   Present on Admission/Arrival: No  Placed By: In ED  Location: Oral   $ Intubation Emergent  $ Yes   Secured At 24 cm   Measured From Lips   ETT Placement Right   Secured By Commercial tube kenny   Site Assessment Dry   Cuff Pressure 30 cm H2O

## 2024-03-06 NOTE — PROGRESS NOTES
Propofol paused for EEG. Lisa NP at bedside for rhythmic movements on face and upper extremities. 2mg Ativan given.

## 2024-03-06 NOTE — PROGRESS NOTES
Cezar Arora admitted 3/5/2024  8:36 PM FOR Cardiac arrest (HCC)    Patient seen and examined today during multidisciplinary ICU rounds.  Found unresponsive by family outside while preparing dinner, bystander CPR and EMS notified, downtime 10-15 minutes.  Intubated, on propofol, fentanyl added this morning for vent synchrony.    Past Medical History:   Diagnosis Date    Asthma     High blood pressure     Mononucleosis 5/2016    Sleep apnea       Past Surgical History:   Procedure Laterality Date    CHOLECYSTECTOMY  6/10/16    LAPAROSCOPIC CHOLECYSTECTOMY WITH CHOLANGIOGRAM    OTHER SURGICAL HISTORY      vein in testicle        /67   Pulse 91   Temp 99.8 °F (37.7 °C) (Bladder)   Resp 20   Ht 1.88 m (6' 2.02\")   Wt 127 kg (279 lb 15.8 oz)   SpO2 97%   BMI 35.93 kg/m²     CVC Triple Lumen 03/05/24 Right Femoral (Active)   Placement Date/Time: 03/05/24 2256   Inserted by: BP MD  Orientation: Right  Location: Femoral       ETT  (Active)   Placement Date/Time: 03/05/24 2033   Present on Admission/Arrival: No  Placed By: In ED  Location: Oral       Plan:  Patient remains intubated, sedation increased with concern for seizure activity this morning.  Echo completed, cardiology following, await neuro improvement.  Hyperkalemia resolved, trend labs. Levophed added for hemodynamic support.  EEG results reviewed. CT Head results and clinical status concerning for anoxic injury.  Discussed with family at the bedside. Plan for MRI brain in the morning.    Discussed with RN at the bedside.      Lisa Gonsalez, APRN-CNP
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
## 2024-03-06 NOTE — PROCEDURES
INTERPRETATION:  This 25-minute, computer-assisted video EEG recording is abnormal.  It showed severe continuous generalized slowing background activity.  There was no clear-cut epileptiform discharge in the study.  The EEG findings were consistent with severe nonspecific generalized cerebral dysfunction or medication effect.    CLASSIFICATION:  Dysrhythmia grade 2, generalized.  EKG channel.    DESCRIPTION:    BACKGROUND: The EEG background showed nearly continuous EEG attenuation with intermittent low amplitude 2 to 4 Hz delta activity.  The EEG showed more variability and reactivity.  There was no significant change on the EEG background with photic stimulation.  Hyperventilation was omitted due to patient's condition.    INTERICTAL DISCHARGES: None    CLINICAL EVENTS:  None    The EKG channel was unremarkable.

## 2024-03-07 ENCOUNTER — APPOINTMENT (OUTPATIENT)
Dept: MRI IMAGING | Age: 44
End: 2024-03-07
Payer: COMMERCIAL

## 2024-03-07 LAB
ANION GAP SERPL CALCULATED.3IONS-SCNC: 11 MMOL/L (ref 3–16)
ANION GAP SERPL CALCULATED.3IONS-SCNC: 8 MMOL/L (ref 3–16)
ANION GAP SERPL CALCULATED.3IONS-SCNC: 9 MMOL/L (ref 3–16)
APTT BLD: 26.4 SEC (ref 22.7–35.9)
BASE EXCESS BLDA CALC-SCNC: -1.1 MMOL/L (ref -3–3)
BUN SERPL-MCNC: 14 MG/DL (ref 7–20)
BUN SERPL-MCNC: 18 MG/DL (ref 7–20)
BUN SERPL-MCNC: 20 MG/DL (ref 7–20)
CALCIUM SERPL-MCNC: 8.3 MG/DL (ref 8.3–10.6)
CALCIUM SERPL-MCNC: 8.6 MG/DL (ref 8.3–10.6)
CALCIUM SERPL-MCNC: 8.6 MG/DL (ref 8.3–10.6)
CHLORIDE SERPL-SCNC: 97 MMOL/L (ref 99–110)
CHLORIDE SERPL-SCNC: 98 MMOL/L (ref 99–110)
CHLORIDE SERPL-SCNC: 98 MMOL/L (ref 99–110)
CO2 BLDA-SCNC: 25.8 MMOL/L
CO2 SERPL-SCNC: 24 MMOL/L (ref 21–32)
CO2 SERPL-SCNC: 25 MMOL/L (ref 21–32)
CO2 SERPL-SCNC: 26 MMOL/L (ref 21–32)
COHGB MFR BLDA: 0.7 % (ref 0–1.5)
CREAT SERPL-MCNC: 0.7 MG/DL (ref 0.9–1.3)
CREAT SERPL-MCNC: 0.8 MG/DL (ref 0.9–1.3)
CREAT SERPL-MCNC: 1.1 MG/DL (ref 0.9–1.3)
DEPRECATED RDW RBC AUTO: 13.9 % (ref 12.4–15.4)
DEPRECATED RDW RBC AUTO: 14.4 % (ref 12.4–15.4)
GFR SERPLBLD CREATININE-BSD FMLA CKD-EPI: >60 ML/MIN/{1.73_M2}
GLUCOSE SERPL-MCNC: 119 MG/DL (ref 70–99)
GLUCOSE SERPL-MCNC: 138 MG/DL (ref 70–99)
GLUCOSE SERPL-MCNC: 139 MG/DL (ref 70–99)
HCO3 BLDA-SCNC: 24.4 MMOL/L (ref 21–29)
HCT VFR BLD AUTO: 46 % (ref 40.5–52.5)
HCT VFR BLD AUTO: 46.5 % (ref 40.5–52.5)
HGB BLD-MCNC: 15.5 G/DL (ref 13.5–17.5)
HGB BLD-MCNC: 15.6 G/DL (ref 13.5–17.5)
HGB BLDA-MCNC: 16.6 G/DL (ref 13.5–17.5)
INR PPP: 1.07 (ref 0.84–1.16)
MAGNESIUM SERPL-MCNC: 1.6 MG/DL (ref 1.8–2.4)
MAGNESIUM SERPL-MCNC: 1.9 MG/DL (ref 1.8–2.4)
MAGNESIUM SERPL-MCNC: 2.3 MG/DL (ref 1.8–2.4)
MCH RBC QN AUTO: 32.6 PG (ref 26–34)
MCH RBC QN AUTO: 32.7 PG (ref 26–34)
MCHC RBC AUTO-ENTMCNC: 33.6 G/DL (ref 31–36)
MCHC RBC AUTO-ENTMCNC: 33.7 G/DL (ref 31–36)
MCV RBC AUTO: 97 FL (ref 80–100)
MCV RBC AUTO: 97 FL (ref 80–100)
METHGB MFR BLDA: 0.2 %
O2 THERAPY: ABNORMAL
PATH INTERP BLD-IMP: NORMAL
PCO2 BLDA: 43.6 MMHG (ref 35–45)
PH BLDA: 7.37 [PH] (ref 7.35–7.45)
PLATELET # BLD AUTO: 118 K/UL (ref 135–450)
PLATELET # BLD AUTO: 84 K/UL (ref 135–450)
PLATELET BLD QL SMEAR: ABNORMAL
PMV BLD AUTO: 9.3 FL (ref 5–10.5)
PMV BLD AUTO: 9.4 FL (ref 5–10.5)
PO2 BLDA: 72.7 MMHG (ref 75–108)
POTASSIUM SERPL-SCNC: 4.2 MMOL/L (ref 3.5–5.1)
POTASSIUM SERPL-SCNC: 4.7 MMOL/L (ref 3.5–5.1)
POTASSIUM SERPL-SCNC: 4.8 MMOL/L (ref 3.5–5.1)
PROTHROMBIN TIME: 13.9 SEC (ref 11.5–14.8)
RBC # BLD AUTO: 4.74 M/UL (ref 4.2–5.9)
RBC # BLD AUTO: 4.79 M/UL (ref 4.2–5.9)
SAO2 % BLDA: 95.1 %
SLIDE REVIEW: ABNORMAL
SODIUM SERPL-SCNC: 130 MMOL/L (ref 136–145)
SODIUM SERPL-SCNC: 132 MMOL/L (ref 136–145)
SODIUM SERPL-SCNC: 134 MMOL/L (ref 136–145)
TROPONIN, HIGH SENSITIVITY: 22 NG/L (ref 0–22)
WBC # BLD AUTO: 13 K/UL (ref 4–11)
WBC # BLD AUTO: 15.5 K/UL (ref 4–11)

## 2024-03-07 PROCEDURE — 80048 BASIC METABOLIC PNL TOTAL CA: CPT

## 2024-03-07 PROCEDURE — 6360000002 HC RX W HCPCS: Performed by: HOSPITALIST

## 2024-03-07 PROCEDURE — 6370000000 HC RX 637 (ALT 250 FOR IP): Performed by: NURSE PRACTITIONER

## 2024-03-07 PROCEDURE — 2580000003 HC RX 258: Performed by: HOSPITALIST

## 2024-03-07 PROCEDURE — 70551 MRI BRAIN STEM W/O DYE: CPT

## 2024-03-07 PROCEDURE — 82803 BLOOD GASES ANY COMBINATION: CPT

## 2024-03-07 PROCEDURE — 6360000002 HC RX W HCPCS: Performed by: NURSE PRACTITIONER

## 2024-03-07 PROCEDURE — 36592 COLLECT BLOOD FROM PICC: CPT

## 2024-03-07 PROCEDURE — 85730 THROMBOPLASTIN TIME PARTIAL: CPT

## 2024-03-07 PROCEDURE — 99223 1ST HOSP IP/OBS HIGH 75: CPT | Performed by: PSYCHIATRY & NEUROLOGY

## 2024-03-07 PROCEDURE — 84484 ASSAY OF TROPONIN QUANT: CPT

## 2024-03-07 PROCEDURE — 85027 COMPLETE CBC AUTOMATED: CPT

## 2024-03-07 PROCEDURE — 85610 PROTHROMBIN TIME: CPT

## 2024-03-07 PROCEDURE — 99291 CRITICAL CARE FIRST HOUR: CPT | Performed by: INTERNAL MEDICINE

## 2024-03-07 PROCEDURE — 6360000002 HC RX W HCPCS: Performed by: INTERNAL MEDICINE

## 2024-03-07 PROCEDURE — 94003 VENT MGMT INPAT SUBQ DAY: CPT

## 2024-03-07 PROCEDURE — 83735 ASSAY OF MAGNESIUM: CPT

## 2024-03-07 PROCEDURE — 2580000003 HC RX 258: Performed by: INTERNAL MEDICINE

## 2024-03-07 PROCEDURE — 2000000000 HC ICU R&B

## 2024-03-07 PROCEDURE — C9113 INJ PANTOPRAZOLE SODIUM, VIA: HCPCS | Performed by: NURSE PRACTITIONER

## 2024-03-07 PROCEDURE — 99292 CRITICAL CARE ADDL 30 MIN: CPT | Performed by: INTERNAL MEDICINE

## 2024-03-07 PROCEDURE — 2500000003 HC RX 250 WO HCPCS: Performed by: NURSE PRACTITIONER

## 2024-03-07 PROCEDURE — 6370000000 HC RX 637 (ALT 250 FOR IP): Performed by: HOSPITALIST

## 2024-03-07 RX ORDER — HEPARIN SODIUM 10000 [USP'U]/100ML
2200 INJECTION, SOLUTION INTRAVENOUS CONTINUOUS
Status: DISCONTINUED | OUTPATIENT
Start: 2024-03-07 | End: 2024-03-09 | Stop reason: HOSPADM

## 2024-03-07 RX ORDER — HEPARIN SODIUM 1000 [USP'U]/ML
2000 INJECTION, SOLUTION INTRAVENOUS; SUBCUTANEOUS PRN
Status: DISCONTINUED | OUTPATIENT
Start: 2024-03-07 | End: 2024-03-09 | Stop reason: HOSPADM

## 2024-03-07 RX ORDER — HEPARIN SODIUM 1000 [USP'U]/ML
4000 INJECTION, SOLUTION INTRAVENOUS; SUBCUTANEOUS ONCE
Status: COMPLETED | OUTPATIENT
Start: 2024-03-07 | End: 2024-03-07

## 2024-03-07 RX ORDER — HEPARIN SODIUM 1000 [USP'U]/ML
4000 INJECTION, SOLUTION INTRAVENOUS; SUBCUTANEOUS PRN
Status: DISCONTINUED | OUTPATIENT
Start: 2024-03-07 | End: 2024-03-09 | Stop reason: HOSPADM

## 2024-03-07 RX ADMIN — CEFTRIAXONE SODIUM 1000 MG: 1 INJECTION, POWDER, FOR SOLUTION INTRAMUSCULAR; INTRAVENOUS at 18:44

## 2024-03-07 RX ADMIN — MAGNESIUM SULFATE HEPTAHYDRATE 2000 MG: 40 INJECTION, SOLUTION INTRAVENOUS at 21:26

## 2024-03-07 RX ADMIN — Medication 125 MCG/HR: at 00:55

## 2024-03-07 RX ADMIN — CHLORHEXIDINE GLUCONATE 15 ML: 1.2 RINSE ORAL at 09:02

## 2024-03-07 RX ADMIN — PANTOPRAZOLE SODIUM 40 MG: 40 INJECTION, POWDER, FOR SOLUTION INTRAVENOUS at 09:02

## 2024-03-07 RX ADMIN — ACETAMINOPHEN 325MG 650 MG: 325 TABLET ORAL at 16:59

## 2024-03-07 RX ADMIN — HEPARIN SODIUM 1000 UNITS/HR: 10000 INJECTION, SOLUTION INTRAVENOUS at 18:56

## 2024-03-07 RX ADMIN — Medication 10 ML: at 09:02

## 2024-03-07 RX ADMIN — CHLORHEXIDINE GLUCONATE 15 ML: 1.2 RINSE ORAL at 20:19

## 2024-03-07 RX ADMIN — MUPIROCIN: 20 OINTMENT TOPICAL at 20:19

## 2024-03-07 RX ADMIN — PROPOFOL 20 MCG/KG/MIN: 10 INJECTION, EMULSION INTRAVENOUS at 04:59

## 2024-03-07 RX ADMIN — ENOXAPARIN SODIUM 30 MG: 100 INJECTION SUBCUTANEOUS at 09:01

## 2024-03-07 RX ADMIN — Medication 125 MCG/HR: at 09:08

## 2024-03-07 RX ADMIN — WHITE PETROLATUM 57.7 %-MINERAL OIL 31.9 % EYE OINTMENT: at 05:52

## 2024-03-07 RX ADMIN — PROPOFOL 25 MCG/KG/MIN: 10 INJECTION, EMULSION INTRAVENOUS at 00:08

## 2024-03-07 RX ADMIN — WHITE PETROLATUM 57.7 %-MINERAL OIL 31.9 % EYE OINTMENT: at 18:57

## 2024-03-07 RX ADMIN — Medication 10 ML: at 20:17

## 2024-03-07 RX ADMIN — PROPOFOL 20 MCG/KG/MIN: 10 INJECTION, EMULSION INTRAVENOUS at 20:47

## 2024-03-07 RX ADMIN — MUPIROCIN: 20 OINTMENT TOPICAL at 09:02

## 2024-03-07 RX ADMIN — WHITE PETROLATUM 57.7 %-MINERAL OIL 31.9 % EYE OINTMENT: at 13:10

## 2024-03-07 RX ADMIN — HEPARIN SODIUM 4000 UNITS: 1000 INJECTION INTRAVENOUS; SUBCUTANEOUS at 18:51

## 2024-03-07 ASSESSMENT — PULMONARY FUNCTION TESTS
PIF_VALUE: 19
PIF_VALUE: 19
PIF_VALUE: 36
PIF_VALUE: 16
PIF_VALUE: 22
PIF_VALUE: 18
PIF_VALUE: 19
PIF_VALUE: 20
PIF_VALUE: 20
PIF_VALUE: 21
PIF_VALUE: 24
PIF_VALUE: 23
PIF_VALUE: 34
PIF_VALUE: 18
PIF_VALUE: 18
PIF_VALUE: 19
PIF_VALUE: 20
PIF_VALUE: 19
PIF_VALUE: 18
PIF_VALUE: 21
PIF_VALUE: 32
PIF_VALUE: 34
PIF_VALUE: 19
PIF_VALUE: 19
PIF_VALUE: 23
PIF_VALUE: 20
PIF_VALUE: 22
PIF_VALUE: 19
PIF_VALUE: 21
PIF_VALUE: 21
PIF_VALUE: 18
PIF_VALUE: 19
PIF_VALUE: 16
PIF_VALUE: 19
PIF_VALUE: 18

## 2024-03-07 NOTE — PROGRESS NOTES
Shift: Days    Admitting diagnosis: Cardiac Arrest    Presentation to hospital: unresponsive    Surgery: No    Nursing assessment at handoff  stable    Emergency Contact/POA:Aster  Family updated: Yes    Most recent vitals: /63   Pulse 97   Temp 99.2 °F (37.3 °C) (Bladder)   Resp 25   Ht 1.88 m (6' 2.02\")   Wt 127 kg (279 lb 15.8 oz)   SpO2 97%   BMI 35.93 kg/m²      Rhythm: Normal Sinus Rhythm     NC/HFNC- 0 lpm  Respiratory support: - No ventilator support  - Ventilator Settings:    Vt (Set, mL): 450 mL  Resp Rate (Set): 20 bpm  FiO2 : 40 %    PEEP/CPAP (cmH2O): 5       Vent days: Day 1      Increased O2 requirements: No    Admission weight Weight - Scale: 127 kg (279 lb 15.8 oz)  Today's weight   Wt Readings from Last 1 Encounters:   03/06/24 127 kg (279 lb 15.8 oz)         UOP >30ml/hr: no     [place] need assessed each shift: Yes    Restraints: No Order current and documentation up to date?    Lines/Drains  LDA Insertion Date Discontinued Date Dressing Changes   PIV  3/5/2024     TLC  3/5/2024     Arterial       Sotelo  3/5/2024     Vas Cath      ETT  3/5/2024     Surgical drains        Night Shift Hospitalist Interventions    Problem(Brief) Date Time Intervention Physician contacted                                               Drip rates at handoff:    sodium chloride      propofol 25 mcg/kg/min (03/06/24 1838)    dextrose      fentaNYL 100 mcg/hr (03/06/24 1516)    norepinephrine Stopped (03/06/24 1606)       Hospital Course Daily Updates:  Admit Day# 1    3/6 DAYS  -EEG completed:shows concern for anoxia   -Echo- EF 60-65%  -40 Lasix for pulm edema  -Seizure like activity-neuro consulted   -plan for MRI tomorrow         Lab Data:   CBC:   Recent Labs     03/05/24  2258 03/06/24  0144 03/06/24  0443   WBC 11.7*  --  15.4*   HGB 15.7 19.5* 16.3   HCT 47.0  --  48.3   MCV 97.9  --  97.3   *  --  136     BMP:    Recent Labs     03/06/24  1000 03/06/24  1805   * 129*   K 4.9 4.6   CO2 22  23   BUN 15 18   CREATININE 1.0 1.2     LIVR:   Recent Labs     03/05/24  2100   AST 51*   ALT 31     PT/INR:   Recent Labs     03/05/24  2100 03/05/24  2258   PROT 6.5  --    INR  --  1.17*     APTT: No results for input(s): \"APTT\" in the last 72 hours.  ABG:   Recent Labs     03/06/24  0144 03/06/24  0554   PHART 7.216* 7.291*   [id number] 43.9 36.4   PO2ART 199.6* 218.9*     Consults (if GI or Nephrology- which group?)-  NO

## 2024-03-07 NOTE — PLAN OF CARE
Problem: Discharge Planning  Goal: Discharge to home or other facility with appropriate resources  Outcome: Progressing     Problem: Safety - Medical Restraint  Goal: Remains free of injury from restraints (Restraint for Interference with Medical Device)  Description: INTERVENTIONS:  1. Determine that other, less restrictive measures have been tried or would not be effective before applying the restraint  2. Evaluate the patient's condition at the time of restraint application  3. Inform patient/family regarding the reason for restraint  4. Q2H: Monitor safety, psychosocial status, comfort, nutrition and hydration  Outcome: Progressing     Problem: Pain  Goal: Verbalizes/displays adequate comfort level or baseline comfort level  Outcome: Progressing     Problem: Safety - Adult  Goal: Free from fall injury  Outcome: Progressing     Problem: Nutrition Deficit:  Goal: Optimize nutritional status  Outcome: Progressing     Problem: Skin/Tissue Integrity  Goal: Absence of new skin breakdown  Description: 1.  Monitor for areas of redness and/or skin breakdown  2.  Assess vascular access sites hourly  3.  Every 4-6 hours minimum:  Change oxygen saturation probe site  4.  Every 4-6 hours:  If on nasal continuous positive airway pressure, respiratory therapy assess nares and determine need for appliance change or resting period.  Outcome: Progressing     Problem: Confusion  Goal: Confusion, delirium, dementia, or psychosis is improved or at baseline  Description: INTERVENTIONS:  1. Assess for possible contributors to thought disturbance, including medications, impaired vision or hearing, underlying metabolic abnormalities, dehydration, psychiatric diagnoses, and notify attending LIP  2. [place] high risk fall precautions, as indicated  3. Provide frequent short contacts to provide reality reorientation, refocusing and direction  4. Decrease environmental stimuli, including noise as appropriate  5. Monitor and intervene to  maintain adequate nutrition, hydration, elimination, sleep and activity  6. If unable to ensure safety without constant attention obtain sitter and review sitter guidelines with assigned personnel  7. Initiate Psychosocial CNS and Spiritual Care consult, as indicated  Outcome: Progressing

## 2024-03-07 NOTE — PROGRESS NOTES
Shift: [id number]     Admitting diagnosis: Cardiac Arrest    Presentation to hospital: unresponsive, CPR in progress upon arrival to ED    Surgery: No    Nursing assessment at handoff  stable    Emergency Contact/POA:Aster  Family updated: Yes    Most recent vitals: BP (!) 100/55   Pulse 87   Temp 98.7 °F (37.1 °C) (Bladder)   Resp 22   Ht 1.88 m (6' 2.02\")   Wt 127 kg (279 lb 15.8 oz)   SpO2 95%   BMI 35.93 kg/m²      Rhythm: Normal Sinus Rhythm     NC/HFNC- 0 lpm  Respiratory support: - No ventilator support  - Ventilator Settings:    Vt (Set, mL): 450 mL  Resp Rate (Set): 20 bpm  FiO2 : 40 %    PEEP/CPAP (cmH2O): 5       Vent days: Day 1      Increased O2 requirements: No    Admission weight Weight - Scale: 127 kg (279 lb 15.8 oz)  Today's weight   Wt Readings from Last 1 Encounters:   03/06/24 127 kg (279 lb 15.8 oz)         UOP >30ml/hr: no     Sotelo need assessed each shift: Yes    Restraints: No Order current and documentation up to date?    Lines/Drains  LDA Insertion Date Discontinued Date Dressing Changes   PIV  3/5/2024     TLC  3/5/2024     Arterial       Sotelo  3/5/2024     Vas Cath      ETT  3/5/2024     Surgical drains        Night Shift Hospitalist Interventions    Problem(Brief) Date Time Intervention Physician contacted                                               Drip rates at handoff:    sodium chloride      propofol 20 mcg/kg/min (03/07/24 4114)    dextrose      fentaNYL 125 mcg/hr (03/07/24 1598)    norepinephrine 4 mcg/min (03/07/24 4335)       Hospital Course Daily Updates:  Admit Day# 1    3/6 DAYS  -EEG completed:shows concern for anoxia   -Echo- EF 60-65%  -40 Lasix for pulm edema  -Seizure like activity-neuro consulted   -plan for MRI tomorrow     Admit Day #1 Nights 3/6  -MRI screening form completed  -UOP:485ml urine became more cloudy with sediment overnight  -6 and 9 beat run of Vtach within seconds of one another. Electrolytes monitored, no other events noted.    -intermittent levophed  -NG output: 400ml        Lab Data:   CBC:   Recent Labs     03/06/24  0443 03/07/24  0551   WBC 15.4* 15.5*   HGB 16.3 15.5   HCT 48.3 46.0   MCV 97.3 97.0    118*     BMP:    Recent Labs     03/06/24  1805 03/07/24  0132   * 130*   K 4.6 4.2   CO2 23 24   BUN 18 20   CREATININE 1.2 1.1     LIVR:   Recent Labs     03/05/24  2100   AST 51*   ALT 31     PT/INR:   Recent Labs     03/05/24  2100 03/05/24  2258   PROT 6.5  --    INR  --  1.17*     APTT: No results for input(s): \"APTT\" in the last 72 hours.  ABG:   Recent Labs     03/06/24  0554 03/07/24  0551   PHART 7.291* 7.366   QAM9OFD 36.4 43.6   PO2ART 218.9* 72.7*     Consults (if GI or Nephrology- which group?)-  [place], cardiology

## 2024-03-07 NOTE — PROGRESS NOTES
[facility] - Daily Progress/Follow-up Note      Admit Date:  3/5/2024    CHIEF COMPLAINT  Cardiac arrest      INTERVAL HISTORY:  Mr. Arora remains intubated and sedated without ability to follow commands or any purposeful movements.  Has new onset tachypnea and fever today.  MRI head done for prognostication confirming anoxic brain injury.  He remains full code.      TELEMETRY: No significant arrhythmias noted overnight      REVIEW OF SYSTEMS  Unable to obtain due to patient's intubated and unresponsive state      CARDIAC MEDICATIONS  IV heparin    Family, medical and social history reviewed and updated as necessary.      VITALS  BP (!) 140/84   Pulse (!) 105   Temp (!) 100.6 °F (38.1 °C) Comment: tylenol given  Resp 27   Ht 1.88 m (6' 2.02\")   Wt 124.2 kg (273 lb 13 oz)   SpO2 97%   BMI 35.14 kg/m²     Intake/Output Summary (Last 24 hours) at 3/7/2024 1807  Last data filed at 3/7/2024 1600  Gross per 24 hour   Intake 474.41 ml   Output 1640 ml   Net -1165.59 ml       General appearance - intubated and sedated, nonpurposeful facial and upper extremity movements, unable to follow commands  Neck - Supple, symmetrical, trachea midline, no adenopathy, thyroid: not enlarged, symmetric, no tenderness/mass/nodules, no carotid bruit or JVD  Lungs - coarse breath sounds diffusely bilaterally, no rales, rhonchi or wheezing.  Respirations unlabored  Chest wall - No tenderness or deformity  Heart - Regular rate and rhythm, S1, S2 normal, no murmur, no rub or gallop  Abdomen - Soft, non-tender, bowel sounds active all four quadrants,  no masses, no organomegaly  Extremities - Extremities normal, atraumatic, no cyanosis or edema  Pulses - 2+ and symmetric upper and lower extremities  Skin - Skin color, texture, turgor normal, no rashes or lesions      LABS:  Lab Results   Component Value Date/Time    WBC 15.5 03/07/2024 05:51 AM    RBC 4.74 03/07/2024 05:51 AM    HGB 15.5 03/07/2024 05:51 AM    HCT 46.0   No evidence of PE in previously healthy male  Anoxic brain injury  Lactic acidosis  Elevated troponin - very mild elevation.  Echo with normal LV function and without regional wall motion abnormalities.  RV function is normal as well.  History of hypertension  COPD  Alcohol use  Tobacco use  History of DVT - on Eliquis at home, currently on IV heparin  Sleep apnea  Asthma     Given prolonged CPR and downtime, high suspicion for significant anoxic brain injury as evident by nonpurposeful movements and inability to follow commands despite weaning of sedation.  Otherwise somewhat hemodynamically stable.  Etiology for cardiac arrest remains unclear.  No clear indication for immediate coronary evaluation as concern for ACS is very low.     Await final neurology evaluation and prognostication  If he has meaningful neurologic recovery, will consider ischemia evaluation with a coronary angiogram later this admission although this scenario appears unlikely given above findings and concern for anoxic brain injury on MRI head  Will consider EP consult for ICD evaluation although no reports of shockable rhythm  Cardiology will continue to follow peripherally        All questions and concerns were addressed to the patient/family. Alternatives to my treatment as well as risks and benefits of proposed treatment were discussed.      Due to the high probability of clinically significant life threating deterioration of the patient's condition that required my urgent intervention, a total critical care time of 55 minutes was used. This time excludes any time that may have been spent performing procedures. This includes but not limited to vital sign monitoring, telemetry monitoring, continuous pulse oximety, IV medication, clinical response to the IV medications, documentation time , consultation time, interpretation of lab data, review of nursing notes and old record review.         Thank you for allowing to us to participate in the

## 2024-03-07 NOTE — CONSULTS
enoxaparin Sodium (LOVENOX) injection 30 mg  30 mg SubCUTAneous BID Jaycee Myles MD   30 mg at 03/07/24 0901    ondansetron (ZOFRAN-ODT) disintegrating tablet 4 mg  4 mg Oral Q8H PRN Jaycee Myles MD        Or    ondansetron (ZOFRAN) injection 4 mg  4 mg IntraVENous Q6H PRN Jaycee Myles MD        polyethylene glycol (GLYCOLAX) packet 17 g  17 g Oral Daily PRN Jaycee Myles MD        acetaminophen (TYLENOL) tablet 650 mg  650 mg Oral Q6H PRN Jaycee Myles MD        Or    acetaminophen (TYLENOL) suppository 650 mg  650 mg Rectal Q6H PRN Jaycee Myles MD        propofol infusion  5-50 mcg/kg/min IntraVENous Continuous Jaycee Myles MD 15.2 mL/hr at 03/07/24 0800 20 mcg/kg/min at 03/07/24 0800    glucose chewable tablet 16 g  4 tablet Oral PRN Jaycee Myles MD        dextrose bolus 10% 125 mL  125 mL IntraVENous PRN Jaycee Myles MD        Or    dextrose bolus 10% 250 mL  250 mL IntraVENous PRN Jaycee Myles MD        glucagon (rDNA) injection 1 mg  1 mg SubCUTAneous PRN Jaycee Myles MD        dextrose 10 % infusion   IntraVENous Continuous PRN Jaycee Myles MD        [Held by provider] insulin glargine (LANTUS) injection vial 32 Units  0.25 Units/kg SubCUTAneous Nightly Jaycee Myles MD        fentaNYL (SUBLIMAZE) 1,000 mcg in sodium chloride 0.9% 100 mL infusion   mcg/hr IntraVENous Continuous Lisa Gonsalez APRN - CNP 12.5 mL/hr at 03/07/24 0908 125 mcg/hr at 03/07/24 0908    chlorhexidine (PERIDEX) 0.12 % solution 15 mL  15 mL Mouth/Throat BID Lisa Gonsalez APRN - CNP   15 mL at 03/07/24 0902    lubrifresh P.M. (artificial tears) ophthalmic ointment   Both Eyes Q6H Lisa Gonsalez APRN - CNP   Given at 03/07/24 0552    pantoprazole (PROTONIX) injection 40 mg  40 mg IntraVENous Daily Lisa Gonsalez APRN - CNP   40 mg at 03/07/24 0902    mupirocin (BACTROBAN) 2 % ointment   Each Nostril BID SetserLuc MD   Given at 03/07/24 0902    LORazepam (ATIVAN) injection 2 mg  2 mg IntraVENous Q15 Min PRN  arrest.    Plan:    1.  Seizure precaution.  2.  MRI brain without contrast.  3.  Minimize sedation.    75 minutes were spent with the patient with greater than 50% of the time spent in counseling and coordination of care.    Morgan Rodriguez MD

## 2024-03-07 NOTE — PROGRESS NOTES
PULMONARY AND CRITICAL CARE INPATIENT NOTE        Cezar Arora   : 1980  MRN: [id number]     Admitting Physician: Jacyee Myles MD  Attending Physician: Luc Willard MD  PCP: Bucky De La Rosa PA-C    Admission: 3/5/2024   Date of Service: 3/7/2024    Chief Complaint   Patient presents with    Cardiac Arrest           ASSESSMENT & PLAN       43 y.o. pleasant  male patient with:    Hospital Problems             Last Modified POA    * (Principal) Cardiac arrest (HCC) 3/6/2024 Yes    Lactic acidosis 3/6/2024 Yes    Elevated troponin 3/6/2024 Yes    Acute respiratory failure with hypoxia (HCC) 3/6/2024 Yes        # Out of hospital cardiac arrest.  PEA.  Precipitating event under evaluation.  Negative CTA chest.  No clear ACS on EKG. no QTc prolongation.  No dissection.  Negative initial troponin.  Total resuscitation time >  20 minutes  # Acute encephalopathy.  Suspected next brain injury.  Diffuse generalized dysfunction on EEG.  Some loss of gray-white matter differentiation on initial CT.  # Rhythmic mouth movement concerning for possible seizures.  Negative EEG for seizures  # Severe combined acute metabolic and respiratory acidosis.  # Lactic acidosis, severe  # MARIA TERESA  # Hyperkalemia  # Untreated sleep apnea.  Could not tolerate CPAP or BiPAP therapy  # Childhood asthma.  Bronchitis currently with recent exacerbation and persistent cough  # Polycythemia.  Had phlebotomy lately.  Likely secondary of untreated sleep apnea  # Hypertension since his 20s on blood pressure medications  # Tobacco abuse more than 25-pack-year smoking history still smoking  # History of lung nodules [facility]  # History of unprovoked DVT on Eliquis  Ventilator settings reviewed and adjusted for lung protective ventilation  Continue sedation and titrate for ventilator synchrony  Brain MRI this morning  Neurology on board  Cardiology on board.  No plan for intervention with no near arrhythmia or ACS.  Will

## 2024-03-07 NOTE — PROGRESS NOTES
03/07/24 1227   B: Both Spontaneous Awakening and Breathing Trials   Was Patient Receiving Mechanical Ventilation Yes   Safety Screening Spontaneous Breathing Trial (SBT) Patient is receiving vasopressor  (MRI for anoxic injury)   Weaning Parameters   Spontaneous Breathing Trial Complete No (comment)  (MRI for anoxic injury)   Chavez Agitation Sedation Scale (RASS) -4

## 2024-03-07 NOTE — PROGRESS NOTES
Spontaneous Awakening Trial    - Absolute contraindications to SAT include:   -Delirium tremens.  -On neuromuscular blocking agent.  -Active seizures.  -Therapeutic hypothermia with core body temperature less than 37 degrees Celsius.  -Prone position.  -Withdrawal of life support planned.    - If patient has any relative contraindications:  Call the provider to decide if should proceed with SAT.  -Withdrawal from alcohol.  -Acute myocardial infarction in past 24 hours.  -Traumatic brain injury in past 24 hours.  -Intracranial pressure (ICP) greater than 20mmHg.  -RASS goal less than or equal to -4 or current RASS greater than +2.     - If patient has no absolute contraindications and no provider unapproved relative contraindication to SAT:   -Titrate all sedative medications including analgesics per the orders in the MAR.  -Discontinue all sedative medications including analgesics if used for the sole purpose of sedation.    -Continue to give intermittent analgesic prn pain medications during SAT.    - Criteria for SAT failure:   -RR greater than 35 for greater than 5 minutes.  -Increased use of accessory respiratory muscles.  -Sp02 less than 88% for greater than 5 minutes.  -Acute Cardiac Arrhythmias.  -ICP increase to greater than 20 mmHg.  -End tidal C02 increases or decreases by greater than 5 mmHg.    - If patient fails SAT: re-initiate sedation at half the previous dose and follow titration parameters within the sedation medication order.     - Reassess RASS hourly during SAT: if patient does not develop any SAT failure criteria and reaches a RASS of -1 or greater then notify RT for Spontaneous Breathing Trial (SBT)    Patient does not meet criteria for spontaneous awakening trial. Assessed at 0600 hours.     Sedation is currently maintained.     Current RASS score is  -4 for ventilator synchrony .     Safety assessed. Restraints not needed at this time.

## 2024-03-07 NOTE — PROGRESS NOTES
03/07/24 1531   Patient Observation   Pulse (!) 110   Respirations 30   SpO2 95 %   Vent Information   Equipment Changed HME   Vent Mode AC/VC   Ventilator Settings   FiO2  50 %   Vt (Set, mL) 450 mL   Resp Rate (Set) 20 bpm   PEEP/CPAP (cmH2O) 5   Vent Patient Data (Readings)   Vt (Measured) 463 mL   Peak Inspiratory Pressure (cmH2O) 19 cmH2O   Rate Measured 28 br/min   Minute Volume (L/min) 13 Liters   Mean Airway Pressure (cmH2O) 10 cmH20   Plateau Pressure (cm H2O) 0 cm H2O   Driving Pressure -5   I:E Ratio 1:1.70   Vent Alarm Settings   High Pressure (cmH2O) 40 cmH2O   Low Minute Volume (lpm) 2 L/min   RR High (bpm) 45 br/min   Additional Respiratoray Assessments   Humidification Source HME   Circuit Condensation Drained   Ambu Bag With Mask At Bedside Yes   Airway Clearance   Suction Oral   Subglottic Suction Done Yes   Suction Device Simeon   Sputum Method Obtained Oral   Sputum Amount Small   Sputum Color/Odor Clear   Sputum Consistency Thick   ETT    Placement Date/Time: 03/05/24 2033   Present on Admission/Arrival: No  Placed By: In ED  Location: Oral   Secured At 25 cm   Measured From Lips   ETT Placement Center   Secured By Commercial tube kenny   Site Assessment Dry

## 2024-03-07 NOTE — PROGRESS NOTES
03/07/24 1150   Patient Transport   Time Spent Transporting 16-30   Transport Ventillation Type Transport vent   Transport From ICU   Transport Destination MRI   Transport Destination ICU   Emergency Equipment Included Yes

## 2024-03-07 NOTE — PROGRESS NOTES
Ethics consult placed, after speaking with the ethics community as stated as long as the mother and fiance agree then the decisions are okay to be made by both of them.

## 2024-03-07 NOTE — PROGRESS NOTES
Hospital Medicine Progress Note      Date of Admission: 3/5/2024  Hospital Day: 3    Chief Admission Complaint: Cardiac arrest     Subjective: Patient sedated on ventilator    Presenting Admission History:       Cezar Arora is a 43 y.o. male with pmh of hypertension and hyperlipidemia who was brought into the emergency room via EMS after a cardiac arrest.  History obtained by the patient's fiancé at the bedside reports that he had endorsed a left flank cramping earlier in the day but did not think much of it.  The patient had not endorsed any additional complaints to his family.  He was cooking on the [employer] outside when the patient's kids found him unresponsive on the ground.  The patient's fiancé noticed that he was gray and not breathing and their wife started CPR.  Upon EMS arrival, the patient was noted to be in cardiac arrest and roughly 20 minutes of CPR was performed.  Upon arrival at the emergency room, the patient again was noted to be pulseless and further CPR was performed for roughly 2 minutes.  The patient was intubated and admitted to the medical ICU.     In the emergency room CT head and cervical spine was concerning for diffuse anoxic brain injury with a posterior scalp hematoma noted.  CT chest revealed minimally displaced anterior rib fractures involving the third to the seventh ribs on the right and third to the sixth rib on the left.  CT abdomen and pelvis with contrast revealed no abnormalities.  EKG revealed sinus rhythm at 66 bpm with T wave flattening in lead II but otherwise no acute abnormalities.  Urine toxicology was negative with WBC count 12.7, glucose 232, lactic acid 9.2, sodium 129, chloride 90, and CO2 20.    Assessment/Plan:      Current Principal Problem:  Cardiac arrest (HCC)    1.  Cardiac arrest unknown etiology.  Echo results reviewed.  No significant cardiac abnormalities noted.  2.  Lactic acidosis.  Lactic acid was trending down.  This is secondary to hypoxia during      This patient has a high likelihood of being discharged tomorrow and is appropriate for A1 Discharge Unit in AM pending clinical course overnight: []Yes  [x]No     ------------------------------------------------------------------------------------------------------------------------------------------------------------------------    MDM    Patient with 1 or more chronic illnesses with severe exacerbation or side effects of treatment and/or 1 acute or chronic illness that poses threat to life or bodily function.    Decision regarding hospitalization or escalation  Patient on drug therapy that requires intensive monitoring.     Medications:  Personally reviewed in detail in conjunction w/ labs as documented for evidence of drug toxicity.     Infusion Medications    sodium chloride      propofol Stopped (03/07/24 1214)    dextrose      fentaNYL Stopped (03/07/24 1214)    norepinephrine Stopped (03/07/24 0757)     Scheduled Medications    sodium chloride flush  5-40 mL IntraVENous 2 times per day    enoxaparin  30 mg SubCUTAneous BID    [Held by provider] insulin glargine  0.25 Units/kg SubCUTAneous Nightly    chlorhexidine  15 mL Mouth/Throat BID    artificial tears   Both Eyes Q6H    pantoprazole  40 mg IntraVENous Daily    mupirocin   Each Nostril BID     PRN Meds: sodium chloride flush, sodium chloride, potassium chloride **OR** potassium alternative oral replacement **OR** potassium chloride, magnesium sulfate, ondansetron **OR** ondansetron, polyethylene glycol, acetaminophen **OR** acetaminophen, glucose, dextrose bolus **OR** dextrose bolus, glucagon (rDNA), dextrose, LORazepam, magnesium sulfate     Labs:  Personally reviewed and interpreted for clinical significance.     Recent Labs     03/05/24  2258 03/06/24  0144 03/06/24  0443 03/07/24  0551   WBC 11.7*  --  15.4* 15.5*   HGB 15.7 19.5* 16.3 15.5   HCT 47.0  --  48.3 46.0   *  --  136 118*     Recent Labs     03/06/24  1805 03/07/24  0132  03/07/24  1317   * 130* 132*   K 4.6 4.2 4.7   CL 97* 97* 98*   CO2 23 24 26   BUN 18 20 18   CREATININE 1.2 1.1 0.8*   CALCIUM 8.3 8.3 8.6   MG 2.30 2.30 1.90     Recent Labs     03/05/24  2100 03/05/24  2258 03/06/24  1000 03/07/24  0132   PROBNP 99  --   --   --    TROPHS <6 13 35* 22     Recent Labs     03/06/24  0347   LABA1C 5.0     Recent Labs     03/05/24  2100   AST 51*   ALT 31   BILITOT 1.1*   ALKPHOS 78     Recent Labs     03/05/24  2258 03/06/24  0443 03/06/24  0652 03/06/24  1025   INR 1.17*  --   --   --    LACTA 6.6* 5.0* 4.5* 2.5*       Urine Cultures: No results found for: \"LABURIN\"  Blood Cultures: No results found for: \"BC\"  No results found for: \"BLOODCULT2\"  Organism: No results found for: \"ORG\"      TAM CAMPOS MD

## 2024-03-07 NOTE — PROGRESS NOTES
03/07/24 1227   Patient Observation   Pulse (!) 105   Respirations 28   SpO2 91 %   Breath Sounds   Breath Sounds Bilateral Rhonchi;Clear   Vent Information   Equipment Changed HME   Vent Mode AC/VC   Ventilator Settings   FiO2  50 %   Vt (Set, mL) 450 mL   Resp Rate (Set) 20 bpm   PEEP/CPAP (cmH2O) 5   Vent Patient Data (Readings)   Vt (Measured) 512 mL   Peak Inspiratory Pressure (cmH2O) 22 cmH2O   Rate Measured 31 br/min   Minute Volume (L/min) 14.3 Liters   Mean Airway Pressure (cmH2O) 12 cmH20   Plateau Pressure (cm H2O) 0 cm H2O   Driving Pressure -5   I:E Ratio 1.00:1   Backup Apnea On   Vent Alarm Settings   High Pressure (cmH2O) 40 cmH2O   Low Minute Volume (lpm) 2 L/min   Low Exhaled Vt (ml) 200 mL   RR High (bpm) 45 br/min   Apnea (secs) 20 secs   Additional Respiratoray Assessments   Humidification Source HME   Circuit Condensation Drained   Ambu Bag With Mask At Bedside Yes   Airway Clearance   Suction ET Tube;Oral;Nasal   Subglottic Suction Done Yes   Suction Device Inline suction catheter;Raminuer   Sputum Method Obtained Endotracheal   Sputum Amount Large   Sputum Color/Odor Yellow;Green   Sputum Consistency Thick   ETT    Placement Date/Time: 03/05/24 2033   Present on Admission/Arrival: No  Placed By: In ED  Location: Oral   Secured At 25 cm   Measured From Lips   ETT Placement Right   Secured By Commercial tube kenny   Site Assessment Dry   Cuff Pressure 30 cm H2O

## 2024-03-07 NOTE — CARE COORDINATION
CM update: LOS # 1; Followed by IM,Pulmonology, Cardiology and Neurology. Patient to have MRI without contrast and repeat ECHO. Will follow for POC at discharge.Kacie Mckinney RN

## 2024-03-07 NOTE — PROGRESS NOTES
Mri of head done, Results came back and called to RN. RN had given those results to ERNST Gonzalez.

## 2024-03-07 NOTE — PROGRESS NOTES
03/07/24 0004   Breath Sounds   Breath Sounds Bilateral Clear;Diminished   Vent Information   Equipment Changed HME   Vent Mode AC/VC   $Ventilation $Initial Day   Vent Patient Data (Readings)   Backup Apnea On   Backup Rate 20 Breaths Per Minute   Backup Vt 450   Vent Alarm Settings   High Minute Volume (lpm) 24 L/min   Low Exhaled Vt (ml) 200 mL   High Exhaled Vt (ml) 1000 mL   Apnea (secs) 20 secs   Additional Respiratoray Assessments   Humidification Source HME   Ambu Bag With Mask At Bedside Yes   ETT    Placement Date/Time: 03/05/24 2033   Present on Admission/Arrival: No  Placed By: In ED  Location: Oral   Secured At 25 cm   Measured From Lips   ETT Placement Right   Secured By Commercial tube kenny   Site Assessment Dry   Cuff Pressure 30 cm H2O

## 2024-03-07 NOTE — PROGRESS NOTES
03/06/24 2022   Patient Observation   Pulse 99   Respirations (!) 40   SpO2 96 %   Breath Sounds   Right Upper Lobe Clear;Diminished   Right Middle Lobe Diminished   Right Lower Lobe Diminished   Left Upper Lobe Clear;Diminished   Left Lower Lobe Diminished   Vent Information   Vent Mode AC/VC   Ventilator Settings   FiO2  40 %   Vt (Set, mL) 450 mL   Resp Rate (Set) 20 bpm   PEEP/CPAP (cmH2O) 5   Vent Patient Data (Readings)   Vt (Measured) 466 mL   Peak Inspiratory Pressure (cmH2O) 24 cmH2O   Rate Measured 40 br/min   Minute Volume (L/min) 18.6 Liters   Mean Airway Pressure (cmH2O) 14 cmH20   Plateau Pressure (cm H2O) 0 cm H2O   Driving Pressure -5   I:E Ratio 1.10:1   Vent Alarm Settings   High Pressure (cmH2O) 40 cmH2O   Low Minute Volume (lpm) 2 L/min   High Minute Volume (lpm) 24 L/min   Low Exhaled Vt (ml) 200 mL   RR High (bpm) 45 br/min   Apnea (secs) 20 secs   Additional Respiratoray Assessments   Humidification Source HME   Ambu Bag With Mask At Bedside Yes   Airway Clearance   Suction ET Tube   Subglottic Suction Done Yes   Suction Device Inline suction catheter   Sputum Method Obtained Endotracheal   Sputum Amount   (RN completed prior to RT check)   ETT    Placement Date/Time: 03/05/24 2033   Present on Admission/Arrival: No  Placed By: In ED  Location: Oral   Secured At 25 cm   Measured From Lips   ETT Placement Center   Secured By Commercial tube kenny   Site Assessment Dry   Cuff Pressure 30 cm H2O

## 2024-03-07 NOTE — PROGRESS NOTES
03/07/24 0729   Patient Observation   Pulse 94   Respirations 22   SpO2 94 %   Breath Sounds   Breath Sounds Bilateral Clear;Diminished   Vent Information   Equipment Changed HME   Vent Mode AC/VC   Ventilator Settings   FiO2  30 %   Vt (Set, mL) 450 mL   Resp Rate (Set) 20 bpm   PEEP/CPAP (cmH2O) 5   Vent Patient Data (Readings)   Vt (Measured) 436 mL   Peak Inspiratory Pressure (cmH2O) 19 cmH2O   Rate Measured 23 br/min   Minute Volume (L/min) 11.3 Liters   Mean Airway Pressure (cmH2O) 9.6 cmH20   Plateau Pressure (cm H2O) 0 cm H2O   Driving Pressure -5   I:E Ratio 1:1.40   Backup Apnea On   Vent Alarm Settings   High Pressure (cmH2O) 40 cmH2O   Low Minute Volume (lpm) 2 L/min   Low Exhaled Vt (ml) 200 mL   RR High (bpm) 45 br/min   Apnea (secs) 20 secs   Additional Respiratoray Assessments   Humidification Source HME   Circuit Condensation Drained   Ambu Bag With Mask At Bedside Yes   Airway Clearance   Suction Oral   ETT    Placement Date/Time: 03/05/24 2033   Present on Admission/Arrival: No  Placed By: In ED  Location: Oral   Secured At 25 cm   Measured From Lips   ETT Placement Right   Secured By Commercial tube kenny   Site Assessment Dry

## 2024-03-07 NOTE — PROGRESS NOTES
Dr. Rincon notified at 1700 of pt loss of post tibial pulses. Also notified of mottling that has occurred and is new to pt status.

## 2024-03-08 VITALS
OXYGEN SATURATION: 34 % | DIASTOLIC BLOOD PRESSURE: 20 MMHG | WEIGHT: 267.64 LBS | BODY MASS INDEX: 34.35 KG/M2 | HEIGHT: 74 IN | SYSTOLIC BLOOD PRESSURE: 96 MMHG | TEMPERATURE: 97.4 F

## 2024-03-08 LAB
ANION GAP SERPL CALCULATED.3IONS-SCNC: 10 MMOL/L (ref 3–16)
ANION GAP SERPL CALCULATED.3IONS-SCNC: 4 MMOL/L (ref 3–16)
ANION GAP SERPL CALCULATED.3IONS-SCNC: 6 MMOL/L (ref 3–16)
APTT BLD: 42.9 SEC (ref 22.7–35.9)
APTT BLD: 47.5 SEC (ref 22.7–35.9)
APTT BLD: 54.5 SEC (ref 22.7–35.9)
BASOPHILS # BLD: 0 K/UL (ref 0–0.2)
BASOPHILS NFR BLD: 0.1 %
BUN SERPL-MCNC: 12 MG/DL (ref 7–20)
BUN SERPL-MCNC: 12 MG/DL (ref 7–20)
BUN SERPL-MCNC: 14 MG/DL (ref 7–20)
CALCIUM SERPL-MCNC: 7.1 MG/DL (ref 8.3–10.6)
CALCIUM SERPL-MCNC: 8.9 MG/DL (ref 8.3–10.6)
CALCIUM SERPL-MCNC: 9 MG/DL (ref 8.3–10.6)
CHLORIDE SERPL-SCNC: 101 MMOL/L (ref 99–110)
CHLORIDE SERPL-SCNC: 104 MMOL/L (ref 99–110)
CHLORIDE SERPL-SCNC: 104 MMOL/L (ref 99–110)
CO2 SERPL-SCNC: 23 MMOL/L (ref 21–32)
CO2 SERPL-SCNC: 28 MMOL/L (ref 21–32)
CO2 SERPL-SCNC: 29 MMOL/L (ref 21–32)
CREAT SERPL-MCNC: 0.6 MG/DL (ref 0.9–1.3)
DEPRECATED RDW RBC AUTO: 13.7 % (ref 12.4–15.4)
EOSINOPHIL # BLD: 0 K/UL (ref 0–0.6)
EOSINOPHIL NFR BLD: 0 %
GFR SERPLBLD CREATININE-BSD FMLA CKD-EPI: >60 ML/MIN/{1.73_M2}
GLUCOSE SERPL-MCNC: 127 MG/DL (ref 70–99)
GLUCOSE SERPL-MCNC: 147 MG/DL (ref 70–99)
GLUCOSE SERPL-MCNC: 149 MG/DL (ref 70–99)
HCT VFR BLD AUTO: 45.1 % (ref 40.5–52.5)
HGB BLD-MCNC: 15.1 G/DL (ref 13.5–17.5)
LACTATE BLDV-SCNC: 1.9 MMOL/L (ref 0.4–2)
LYMPHOCYTES # BLD: 1 K/UL (ref 1–5.1)
LYMPHOCYTES NFR BLD: 6.6 %
MAGNESIUM SERPL-MCNC: 2 MG/DL (ref 1.8–2.4)
MAGNESIUM SERPL-MCNC: 2.2 MG/DL (ref 1.8–2.4)
MAGNESIUM SERPL-MCNC: 2.3 MG/DL (ref 1.8–2.4)
MCH RBC QN AUTO: 32.5 PG (ref 26–34)
MCHC RBC AUTO-ENTMCNC: 33.4 G/DL (ref 31–36)
MCV RBC AUTO: 97.4 FL (ref 80–100)
MONOCYTES # BLD: 1.1 K/UL (ref 0–1.3)
MONOCYTES NFR BLD: 7.2 %
NEUTROPHILS # BLD: 13.2 K/UL (ref 1.7–7.7)
NEUTROPHILS NFR BLD: 86.1 %
PLATELET # BLD AUTO: 93 K/UL (ref 135–450)
PMV BLD AUTO: 9.8 FL (ref 5–10.5)
POTASSIUM SERPL-SCNC: 3.9 MMOL/L (ref 3.5–5.1)
POTASSIUM SERPL-SCNC: 4.2 MMOL/L (ref 3.5–5.1)
POTASSIUM SERPL-SCNC: 4.8 MMOL/L (ref 3.5–5.1)
RBC # BLD AUTO: 4.63 M/UL (ref 4.2–5.9)
SODIUM SERPL-SCNC: 135 MMOL/L (ref 136–145)
SODIUM SERPL-SCNC: 137 MMOL/L (ref 136–145)
SODIUM SERPL-SCNC: 137 MMOL/L (ref 136–145)
WBC # BLD AUTO: 15.4 K/UL (ref 4–11)

## 2024-03-08 PROCEDURE — 2500000003 HC RX 250 WO HCPCS: Performed by: NURSE PRACTITIONER

## 2024-03-08 PROCEDURE — 99233 SBSQ HOSP IP/OBS HIGH 50: CPT | Performed by: PSYCHIATRY & NEUROLOGY

## 2024-03-08 PROCEDURE — 6360000002 HC RX W HCPCS: Performed by: INTERNAL MEDICINE

## 2024-03-08 PROCEDURE — 36592 COLLECT BLOOD FROM PICC: CPT

## 2024-03-08 PROCEDURE — 83735 ASSAY OF MAGNESIUM: CPT

## 2024-03-08 PROCEDURE — 83605 ASSAY OF LACTIC ACID: CPT

## 2024-03-08 PROCEDURE — 99291 CRITICAL CARE FIRST HOUR: CPT | Performed by: INTERNAL MEDICINE

## 2024-03-08 PROCEDURE — 80048 BASIC METABOLIC PNL TOTAL CA: CPT

## 2024-03-08 PROCEDURE — 2700000000 HC OXYGEN THERAPY PER DAY

## 2024-03-08 PROCEDURE — 6360000002 HC RX W HCPCS: Performed by: NURSE PRACTITIONER

## 2024-03-08 PROCEDURE — APPNB180 APP NON BILLABLE TIME > 60 MINS: Performed by: NURSE PRACTITIONER

## 2024-03-08 PROCEDURE — 99292 CRITICAL CARE ADDL 30 MIN: CPT | Performed by: INTERNAL MEDICINE

## 2024-03-08 PROCEDURE — 2580000003 HC RX 258: Performed by: HOSPITALIST

## 2024-03-08 PROCEDURE — C9113 INJ PANTOPRAZOLE SODIUM, VIA: HCPCS | Performed by: NURSE PRACTITIONER

## 2024-03-08 PROCEDURE — 6360000002 HC RX W HCPCS: Performed by: HOSPITALIST

## 2024-03-08 PROCEDURE — 94761 N-INVAS EAR/PLS OXIMETRY MLT: CPT

## 2024-03-08 PROCEDURE — 2580000003 HC RX 258: Performed by: INTERNAL MEDICINE

## 2024-03-08 PROCEDURE — 6370000000 HC RX 637 (ALT 250 FOR IP): Performed by: NURSE PRACTITIONER

## 2024-03-08 PROCEDURE — 94003 VENT MGMT INPAT SUBQ DAY: CPT

## 2024-03-08 PROCEDURE — 85025 COMPLETE CBC W/AUTO DIFF WBC: CPT

## 2024-03-08 PROCEDURE — 85730 THROMBOPLASTIN TIME PARTIAL: CPT

## 2024-03-08 RX ORDER — HEPARIN SODIUM 1000 [USP'U]/ML
4000 INJECTION, SOLUTION INTRAVENOUS; SUBCUTANEOUS ONCE
Status: COMPLETED | OUTPATIENT
Start: 2024-03-08 | End: 2024-03-08

## 2024-03-08 RX ORDER — MIDAZOLAM HYDROCHLORIDE 1 MG/ML
1-10 INJECTION, SOLUTION INTRAVENOUS CONTINUOUS
Status: DISCONTINUED | OUTPATIENT
Start: 2024-03-08 | End: 2024-03-09 | Stop reason: HOSPADM

## 2024-03-08 RX ORDER — MORPHINE SULFATE 2 MG/ML
2 INJECTION, SOLUTION INTRAMUSCULAR; INTRAVENOUS
Status: DISCONTINUED | OUTPATIENT
Start: 2024-03-08 | End: 2024-03-09 | Stop reason: HOSPADM

## 2024-03-08 RX ORDER — HEPARIN SODIUM 1000 [USP'U]/ML
4000 INJECTION, SOLUTION INTRAVENOUS; SUBCUTANEOUS ONCE
Status: DISCONTINUED | OUTPATIENT
Start: 2024-03-08 | End: 2024-03-09 | Stop reason: HOSPADM

## 2024-03-08 RX ORDER — LORAZEPAM 2 MG/ML
0.5 INJECTION INTRAMUSCULAR
Status: DISCONTINUED | OUTPATIENT
Start: 2024-03-08 | End: 2024-03-09 | Stop reason: HOSPADM

## 2024-03-08 RX ADMIN — MUPIROCIN: 20 OINTMENT TOPICAL at 08:57

## 2024-03-08 RX ADMIN — Medication 10 ML: at 08:57

## 2024-03-08 RX ADMIN — PROPOFOL 30 MCG/KG/MIN: 10 INJECTION, EMULSION INTRAVENOUS at 00:46

## 2024-03-08 RX ADMIN — PROPOFOL 15 MCG/KG/MIN: 10 INJECTION, EMULSION INTRAVENOUS at 06:38

## 2024-03-08 RX ADMIN — CEFTRIAXONE SODIUM 1000 MG: 1 INJECTION, POWDER, FOR SOLUTION INTRAMUSCULAR; INTRAVENOUS at 17:41

## 2024-03-08 RX ADMIN — HEPARIN SODIUM 4000 UNITS: 1000 INJECTION INTRAVENOUS; SUBCUTANEOUS at 03:20

## 2024-03-08 RX ADMIN — HEPARIN SODIUM 1800 UNITS/HR: 10000 INJECTION, SOLUTION INTRAVENOUS at 13:50

## 2024-03-08 RX ADMIN — WHITE PETROLATUM 57.7 %-MINERAL OIL 31.9 % EYE OINTMENT: at 17:38

## 2024-03-08 RX ADMIN — MIDAZOLAM IN SODIUM CHLORIDE 2 MG/HR: 1 INJECTION INTRAVENOUS at 11:20

## 2024-03-08 RX ADMIN — CHLORHEXIDINE GLUCONATE 15 ML: 1.2 RINSE ORAL at 08:57

## 2024-03-08 RX ADMIN — Medication 150 MCG/HR: at 17:19

## 2024-03-08 RX ADMIN — WHITE PETROLATUM 57.7 %-MINERAL OIL 31.9 % EYE OINTMENT: at 00:08

## 2024-03-08 RX ADMIN — Medication 125 MCG/HR: at 03:21

## 2024-03-08 RX ADMIN — WHITE PETROLATUM 57.7 %-MINERAL OIL 31.9 % EYE OINTMENT: at 05:31

## 2024-03-08 RX ADMIN — PANTOPRAZOLE SODIUM 40 MG: 40 INJECTION, POWDER, FOR SOLUTION INTRAVENOUS at 08:57

## 2024-03-08 RX ADMIN — HEPARIN SODIUM 4000 UNITS: 1000 INJECTION INTRAVENOUS; SUBCUTANEOUS at 11:21

## 2024-03-08 RX ADMIN — WHITE PETROLATUM 57.7 %-MINERAL OIL 31.9 % EYE OINTMENT: at 11:22

## 2024-03-08 ASSESSMENT — PULMONARY FUNCTION TESTS
PIF_VALUE: 15
PIF_VALUE: 19
PIF_VALUE: 18
PIF_VALUE: 17
PIF_VALUE: 16
PIF_VALUE: 18
PIF_VALUE: 20
PIF_VALUE: 17
PIF_VALUE: 18
PIF_VALUE: 18
PIF_VALUE: 17
PIF_VALUE: 18
PIF_VALUE: 18
PIF_VALUE: 20
PIF_VALUE: 20
PIF_VALUE: 25

## 2024-03-08 NOTE — PROGRESS NOTES
Shift: [id number]     Admitting diagnosis: Cardiac Arrest    Presentation to hospital: unresponsive, CPR in progress upon arrival to ED    Surgery: No    Nursing assessment at handoff  stable    Emergency Contact/POA:Aster  Family updated: Yes    Most recent vitals: BP (!) 156/86   Pulse 79   Temp (!) 100.9 °F (38.3 °C)   Resp 18   Ht 1.88 m (6' 2.02\")   Wt 124.2 kg (273 lb 13 oz)   SpO2 98%   BMI 35.14 kg/m²      Rhythm: Normal Sinus Rhythm     NC/HFNC- 0 lpm  Respiratory support: - No ventilator support  - Ventilator Settings:    Vt (Set, mL): 450 mL  Resp Rate (Set): 20 bpm  FiO2 : 50 %    PEEP/CPAP (cmH2O): 5       Vent days: Day 2      Increased O2 requirements: No    Admission weight Weight - Scale: 127 kg (279 lb 15.8 oz)  Today's weight   Wt Readings from Last 1 Encounters:   03/07/24 124.2 kg (273 lb 13 oz)         UOP >30ml/hr: yes    Sotelo need assessed each shift: Yes    Restraints: No Order current and documentation up to date?    Lines/Drains  LDA Insertion Date Discontinued Date Dressing Changes   PIV  3/5/2024     TLC  3/5/2024     Arterial       Sotelo  3/5/2024     Vas Cath      ETT  3/5/2024     Surgical drains        Night Shift Hospitalist Interventions    Problem(Brief) Date Time Intervention Physician contacted                                               Drip rates at handoff:    heparin (PORCINE) Infusion 1,400 Units/hr (03/08/24 0322)    sodium chloride      propofol 20 mcg/kg/min (03/08/24 0322)    dextrose      fentaNYL 125 mcg/hr (03/08/24 0321)    norepinephrine Stopped (03/07/24 0757)       Hospital Course Daily Updates:  Admit Day# 1    3/6 DAYS  -EEG completed:shows concern for anoxia   -Echo- EF 60-65%  -40 Lasix for pulm edema  -Seizure like activity-neuro consulted   -plan for MRI tomorrow     Admit Day #1 Nights 3/6  -MRI screening form completed  -UOP:485ml urine became more cloudy with sediment overnight  -6 and 9 beat run of Vtach within seconds of one another.  Electrolytes monitored, no other events noted.   -intermittent levophed  -NG output: 400ml    Admit Day #2 Days 3/7  -off sedation when received pt. Tmax 103.6, RR 40-50, visible sweat  -sedation restarted with neurology approval, able to wean down by end of shift. Temp normalized  -UOP: 872ml   -NG output: 150ml          Lab Data:   CBC:   Recent Labs     03/07/24  1734 03/08/24  0542   WBC 13.0* 15.4*   HGB 15.6 15.1   HCT 46.5 45.1   MCV 97.0 97.4   PLT 84* 93*     BMP:    Recent Labs     03/07/24  1922 03/08/24  0143   * 137   K 4.8 3.9   CO2 25 23   BUN 14 12   CREATININE 0.7* 0.6*     LIVR:   Recent Labs     03/05/24  2100   AST 51*   ALT 31     PT/INR:   Recent Labs     03/05/24  2100 03/05/24  2258 03/07/24  1734   PROT 6.5  --   --    INR  --  1.17* 1.07       APTT:   Recent Labs     03/07/24  1734 03/08/24  0143   APTT 26.4 54.5*     ABG:   Recent Labs     03/06/24  0554 03/07/24  0551   PHART 7.291* 7.366   [id number] 36.4 43.6   PO2ART 218.9* 72.7*     Consults (if GI or Nephrology- which group?)-  [place], cardiology

## 2024-03-08 NOTE — PROGRESS NOTES
Comprehensive Nutrition Assessment    Type and Reason for Visit:  Reassess    Nutrition Recommendations/Plan:   NPO  Monitor nutrition course of action. If TF is desired and consistent with GOC, consult RD for recommendations  Monitor nutrition adequacy, pertinent labs, bowel habits, wt changes, and clinical progress     Malnutrition Assessment:  Malnutrition Status:  At risk for malnutrition (Comment) (03/06/24 1050)    Context:  Acute Illness     Findings of the 6 clinical characteristics of malnutrition:  Energy Intake:  Mild decrease in energy intake (Comment)    Nutrition Assessment:    Follow up: Pt continues on vent support. Sedated on fentanyl and propofol at 11.4 ml/hr to provide 301 kcals from lipids. MRI showed diffuse anoxic brain injury per neurology note. Weaning sedation, plan for comfort care. Will continue to monitor.    Nutrition Related Findings:    -150 ml NG output 3/7. -5.1 L since admit. Wound Type: None       Current Nutrition Intake & Therapies:    Average Meal Intake: NPO  Average Supplements Intake: NPO  Diet NPO  Current Tube Feeding (TF) Orders:  Feeding Route: Nasogastric  Formula: Peptide Based  Schedule: Continuous  Goal TF & Flush Orders Provides: Vital AF at goal rate of 50 ml/hr x20 hrs to provide 1000 ml TV, 1200 kcals, 75 g protein, 811 ml free water. +1 packet proteinex daily to provide 26 g protein and 104 kcals. Water flushes 30 ml q 4 hrs. Rate may change d/t propofol titrations.    Anthropometric Measures:  Height: 188 cm (6' 2.02\")  Ideal Body Weight (IBW): 190 lbs (86 kg)       Current Body Weight: 121.4 kg (267 lb 10.2 oz),   IBW. Weight Source: Bed Scale  Current BMI (kg/m2): 34.3  Usual Body Weight: 124.3 kg (274 lb 0.5 oz) (2/2/2024)  % Weight Change (Calculated): 2.2                    BMI Categories: Obese Class 1 (BMI 30.0-34.9)    Estimated Daily Nutrient Needs:  Energy Requirements Based On: Kcal/kg (12-15)  Weight Used for Energy Requirements: Current  (127)  Energy (kcal/day): 2504-9801 kcals  Weight Used for Protein Requirements: Ideal (1.2-2)  Protein (g/day): 103-172 g  Method Used for Fluid Requirements: 1 ml/kcal  Fluid (ml/day):      Nutrition Diagnosis:   Inadequate oral intake related to inadequate protein-energy intake, impaired respiratory function as evidenced by NPO or clear liquid status due to medical condition, intubation    Nutrition Interventions:   Food and/or Nutrient Delivery: Continue NPO  Nutrition Education/Counseling: No recommendation at this time  Coordination of Nutrition Care: Continue to monitor while inpatient, Interdisciplinary Rounds       Goals:  Previous Goal Met: No Progress toward Goal(s)  Goals: Meet at least 75% of estimated needs, prior to discharge       Nutrition Monitoring and Evaluation:   Behavioral-Environmental Outcomes: None Identified  Food/Nutrient Intake Outcomes: None Identified  Physical Signs/Symptoms Outcomes: Biochemical Data, Nutrition Focused Physical Findings, Weight, GI Status    Discharge Planning:    Too soon to determine     Mel Zapata RD  Contact: Office: [phone]; [place]: [phone]

## 2024-03-08 NOTE — PROGRESS NOTES
PULMONARY AND CRITICAL CARE INPATIENT NOTE        Cezar Arora   : 1980  MRN: [id number]     Admitting Physician: Jaycee Myles MD  Attending Physician: Neal Tapia MD  PCP: Bucky De La Rosa PA-C    Admission: 3/5/2024   Date of Service: 3/8/2024    Chief Complaint   Patient presents with    Cardiac Arrest           ASSESSMENT & PLAN       43 y.o. pleasant  male patient with:    Hospital Problems             Last Modified POA    * (Principal) Cardiac arrest (HCC) 3/6/2024 Yes    Lactic acidosis 3/6/2024 Yes    Elevated troponin 3/6/2024 Yes    Acute respiratory failure with hypoxia (HCC) 3/6/2024 Yes        # Out of hospital cardiac arrest.  PEA.  Precipitating event under evaluation.  Negative CTA chest.  No clear ACS on EKG. no QTc prolongation.  No dissection.  Negative initial troponin.  Total resuscitation time >  20 minutes  # Severe acute encephalopathy.  Anoxic brain injury evidence on EEG and brain MRI.  Asymmetric pupils  # Rhythmic mouth movement concerning for possible seizures.  Negative EEG for seizures  # Severe combined acute metabolic and respiratory acidosis.  On presentation  # Lactic acidosis, severe.  Improved  # MARIA TERESA  # Hyperkalemia  # Untreated sleep apnea.  Could not tolerate CPAP or BiPAP therapy  # Childhood asthma.  Bronchitis currently with recent exacerbation and persistent cough  # Polycythemia.  Had phlebotomy lately.  Likely secondary of untreated sleep apnea  # Hypertension since his 20s on blood pressure medications  # Tobacco abuse more than 25-pack-year smoking history still smoking  # History of lung nodules followed at [facility]  # History of unprovoked DVT on Eliquis  Ventilator settings reviewed and adjusted for lung protective ventilation  Continue sedation and titrate for ventilator synchrony  Brain MRI findings reviewed with the family as well as prognosis by us and neurology  Appreciate ethics team input as a decision needs to be made by the mom legally  Symmetric air entry.  Coarse breath sounds. No wheezes.  Abdomen: Soft.    Skin, Back & Extremities: No rash.  Neurological: Right pupil 5 mm left pupil 2 mm, nonreactive both, no cough or gag but patient is deeply sedated.  No movements.  Does not open eyes to stimulation  ________________________________________________________  Electronically signed by:  Shade Rincon MD,FACP    3/8/2024    7:56 AM.     [facility]  [address], [place] [address]   Phone (office): [phone]

## 2024-03-08 NOTE — PROGRESS NOTES
03/08/24 0852   Vent Information   Equipment Changed HME   Vent Mode AC/VC   Ventilator Settings   Resp Rate (Set) (S)  18 bpm  (found on rr 18)   Vent Patient Data (Readings)   Inspiratory Time 75 sec   Flow Sensitivity 3 L/min   Backup Apnea On   Backup Rate 18 Breaths Per Minute   Backup Vt 450   Vent Alarm Settings   Low Exhaled Vt (ml) 200 mL   High Exhaled Vt (ml) 1000 mL   Apnea (secs) 20 secs   Additional Respiratoray Assessments   Humidification Source HME   Ambu Bag With Mask At Bedside Yes   Airway Clearance   Suction ET Tube;Oral   Subglottic Suction Done No   Suction Device Inline suction catheter   Sputum Method Obtained Endotracheal   Sputum Amount Small   Sputum Color/Odor Pink tinged   Sputum Consistency Thick   ETT    Placement Date/Time: 03/05/24 2033   Present on Admission/Arrival: No  Placed By: In ED  Location: Oral   Secured At 25 cm   Measured From Lips   ETT Placement Right   Secured By Commercial tube kenny   Site Assessment Dry   Cuff Pressure 32 cm H2O   Supplemental Gases   Supplemental Gases None   Ventilator Associated Pneumonia Bundle   Oral Care Performed Mouth suctioned   Respiratory   Respiratory Interventions Suction - ETT;Suction - oral

## 2024-03-08 NOTE — PROGRESS NOTES
03/07/24 2340   Patient Observation   Pulse 75   Respirations (!) 33   SpO2 99 %   Breath Sounds   Right Upper Lobe Clear   Right Middle Lobe Clear   Right Lower Lobe Diminished   Left Upper Lobe Clear   Left Lower Lobe Diminished   Vent Information   Vent Mode AC/VC   Ventilator Settings   FiO2  50 %   Vt (Set, mL) 450 mL   Resp Rate (Set) 20 bpm   PEEP/CPAP (cmH2O) 5   Vent Patient Data (Readings)   Vt (Measured) 477 mL   Peak Inspiratory Pressure (cmH2O) 16 cmH2O   Rate Measured 33 br/min   Minute Volume (L/min) 15.9 Liters   Mean Airway Pressure (cmH2O) 5.4 cmH20   Plateau Pressure (cm H2O) 0 cm H2O   Driving Pressure -5   I:E Ratio 1:1.20   Flow Sensitivity 3 L/min   Backup Apnea On   Vent Alarm Settings   High Pressure (cmH2O) 40 cmH2O   Low Minute Volume (lpm) 2 L/min   High Minute Volume (lpm) 21 L/min   Low Exhaled Vt (ml) 200 mL   High Exhaled Vt (ml) 1000 mL   RR High (bpm) 65 br/min   Additional Respiratoray Assessments   Humidification Source HME   Ambu Bag With Mask At Bedside Yes   ETT    Placement Date/Time: 03/05/24 2033   Present on Admission/Arrival: No  Placed By: In ED  Location: Oral   Secured At 25 cm   Measured From Lips   ETT Placement Center   Secured By Commercial tube kenny   Site Assessment Dry   Cuff Pressure 30 cm H2O

## 2024-03-08 NOTE — CONSULTS
Low dose Heparin GTT:  Once baseline aPTT drawn give a 4,000 unit IV bolus dose and begin the infusion at 1,000 units/hr.  Check an aPTT 6hrs later.  History of Eliquis, will try to determine when last dose was.   Dosing based on ABW of 99kg.  Pharmacy to manage Heparin - contact for questions.     Heparin 60 units/kg IV x 1 (max 4,000 units), then 12 units/kg/hr (recommended max initial rate 1,000 units/hr). Adjust infusion rate based off aPTT results below.      aPTT < 59                   Heparin 60 units/kg bolus       Increase infusion by 4 units/kg/hr  aPTT 59 - 72.9            Heparin 30 units/kg bolus       Increase infusion by 2 units/kg/hr  aPTT 73 - 102             No bolus                                 No change   aPTT 102.1 - 109        No bolus                                 Decrease infusion by 1 units/kg/hr  aPTT 109.1 - 122.9     No bolus                                 Decrease infusion by 2 units/kg/hr  aPTT 123 or greater   Hold heparin for 1 hour           Decrease infusion by 3 units/kg/hr     Obtain aPTT 6 hours after bolus and 6 hours after any dose change until two consecutive therapeutic aPTT are achieved- then daily.  Ventura Billy [facility] PharmD 3/7/2024 5:12 PM       3/8/24 at 0143  aPTT: 54.5  Plan: Give 4000 units of heparin as bolus, increase heparin infusion rate to 1400 units/hr, recheck aPTT at 0915  Juan J Limon PharmD 3/8/2024  3:05 AM    3/8 0925  aPTT = 42.9 sec  Heparin 4000 units bolus and increase drip rate to 1800 units/hr  Recheck aPTT in 6 hours at 1700  Alexsander Toney PharmD  3/8/2024 at 10:23 AM    ------------------------------  3/8 1825  Low-Dose Heparin Drip  Current Rate: 1800 units/hr  3/8 @ 1750 aPTT Level= 47.5  Plan: Per pharmacy dosing, we will give a bolus dose of 4000 units and increase heparin drip rate to 2200 units/hr    Next Anti-Xa Level: 3/9 @ 0030  Doris Pizarro, PharmD 3/8/2024 6:22 PM

## 2024-03-08 NOTE — PROGRESS NOTES
Spoke with  with neurology regarding patient's tachypnea (40-45 RR) fever (102.8) with visible sweat beads. Okay to restart ordered sedation and keep patient comfortable for the night per neurology.     Electronically signed by Kasie Heck RN on 3/7/2024 at 8:41 PM

## 2024-03-08 NOTE — PROGRESS NOTES
03/07/24 2007   Encounter Summary   Encounter Overview/Reason  Spiritual/Emotional Needs   Service Provided For: Patient and family together   Referral/Consult From: Nurse   Support System Significant other;Family members   Last Encounter  03/07/24  ( visited; provided pastoral support.)   Assessment/Intervention/Outcome   Intervention Sustaining Presence/Ministry of presence   Plan and Referrals   Plan/Referrals Continue Support (comment)  (Will recommend  follow-up visit)

## 2024-03-08 NOTE — PROGRESS NOTES
Hospital Medicine Progress Note      Date of Admission: 3/5/2024  Hospital Day: 4    Chief Admission Complaint:  \"Cardiac arrest\"    Subjective:  no new c/o - on Vent    Presenting Admission History:         Cezar Arora is a 43 y.o. male with pmh of hypertension and hyperlipidemia who was brought into the emergency room via EMS after a cardiac arrest.  History obtained by the patient's fiancé at the bedside reports that he had endorsed a left flank cramping earlier in the day but did not think much of it.  The patient had not endorsed any additional complaints to his family.  He was cooking on the [email or web address] outside when the patient's kids found him unresponsive on the ground.  The patient's fiancé noticed that he was gray and not breathing and their wife started CPR.  Upon EMS arrival, the patient was noted to be in cardiac arrest and roughly 20 minutes of CPR was performed.  Upon arrival at the emergency room, the patient again was noted to be pulseless and further CPR was performed for roughly 2 minutes.  The patient was intubated and admitted to the medical ICU.     In the emergency room CT head and cervical spine was concerning for diffuse anoxic brain injury with a posterior scalp hematoma noted.  CT chest revealed minimally displaced anterior rib fractures involving the third to the seventh ribs on the right and third to the sixth rib on the left.  CT abdomen and pelvis with contrast revealed no abnormalities.  EKG revealed sinus rhythm at 66 bpm with T wave flattening in lead II but otherwise no acute abnormalities.  Urine toxicology was negative with WBC count 12.7, glucose 232, lactic acid 9.2, sodium 129, chloride 90, and CO2 20.       Assessment/Plan:      Current Principal Problem:  Cardiac arrest (HCC)      Cardiac arrest unknown etiology.  Echo results reviewed.  No significant cardiac abnormalities noted.    Lactic acidosis.  Lactic acid was trending down.  This is secondary to hypoxia during cardiac  risk factors:    ----------------------------------------------------------------------  C. Data (any 2)  [] Discussed management of the case with:    [x] Discussed the discharge plan in detail with case mgt including timing/barriers to discharge, need for support services and placement decision   [x] Imaging personally reviewed and interpreted, includes:   [x] Telemetry monitoring w/ NSR   [x] Data Review (any 3)  [] Collateral history obtained from:    [x] All available Consultant notes from yesterday/today were reviewed  [x] All current labs were reviewed and interpreted for clinical significance   [x] Appropriate follow-up labs were ordered    Medications:  Personally reviewed in detail in conjunction w/ labs as documented for evidence of drug toxicity.     Infusion Medications    midazolam      heparin (PORCINE) Infusion 1,400 Units/hr (03/08/24 0645)    sodium chloride      propofol 15 mcg/kg/min (03/08/24 0645)    dextrose      fentaNYL 50 mcg/hr (03/08/24 0645)    norepinephrine Stopped (03/07/24 0757)     Scheduled Medications    heparin (porcine)  4,000 Units IntraVENous Once    cefTRIAXone (ROCEPHIN) IV  1,000 mg IntraVENous Q24H    sodium chloride flush  5-40 mL IntraVENous 2 times per day    [Held by provider] insulin glargine  0.25 Units/kg SubCUTAneous Nightly    chlorhexidine  15 mL Mouth/Throat BID    artificial tears   Both Eyes Q6H    pantoprazole  40 mg IntraVENous Daily    mupirocin   Each Nostril BID     PRN Meds: heparin (porcine), heparin (porcine), sodium chloride flush, sodium chloride, potassium chloride **OR** potassium alternative oral replacement **OR** potassium chloride, magnesium sulfate, ondansetron **OR** ondansetron, polyethylene glycol, acetaminophen **OR** acetaminophen, glucose, dextrose bolus **OR** dextrose bolus, glucagon (rDNA), dextrose, LORazepam, magnesium sulfate     Labs:  Personally reviewed and interpreted for clinical significance.     Recent Labs     03/07/24  9959

## 2024-03-08 NOTE — PROGRESS NOTES
Neurology Progress Note    ID: Cezar Arora is a 43 y.o. male    : 1980     LOS: 2 days     ASSESSMENT    1.  Encephalopathy.  2.  Cardiac arrest (D2)     Examination is very limited due to intubation and sedation.  CT brain showed questionable anoxic brain injury.  EEG showed no evidence of active seizure.  MRI brain showed diffuse anoxic brain injury.    Unfortunately, the meaningful recovery is very unlikely based on the MRI brain.  I discussed the findings with family.     Plan:     1.  Comfort care per ICU team.  2.  Wean off sedation.    There is nothing else neurology can offer.  Neurology will sign off.    Medications:  Scheduled Meds:    cefTRIAXone (ROCEPHIN) IV  1,000 mg IntraVENous Q24H    sodium chloride flush  5-40 mL IntraVENous 2 times per day    [Held by provider] insulin glargine  0.25 Units/kg SubCUTAneous Nightly    chlorhexidine  15 mL Mouth/Throat BID    artificial tears   Both Eyes Q6H    pantoprazole  40 mg IntraVENous Daily    mupirocin   Each Nostril BID     Continuous Infusions:    heparin (PORCINE) Infusion 1,400 Units/hr (24)    sodium chloride      propofol 15 mcg/kg/min (24)    dextrose      fentaNYL 50 mcg/hr (24)    norepinephrine Stopped (24 0757)     PRN Meds: heparin (porcine), heparin (porcine), sodium chloride flush, sodium chloride, potassium chloride **OR** potassium alternative oral replacement **OR** potassium chloride, magnesium sulfate, ondansetron **OR** ondansetron, polyethylene glycol, acetaminophen **OR** acetaminophen, glucose, dextrose bolus **OR** dextrose bolus, glucagon (rDNA), dextrose, LORazepam, magnesium sulfate    OBJECTIVE  Vital signs in the last 24 hours:  Vitals:    24 0900   BP: 135/75   Pulse: 67   Resp: 18   Temp:    SpO2: 93%       Intake/Output last 3 shifts:  I/O last 3 completed shifts:  In: 1043.5 [I.V.:897.7; IV Piggyback:145.8]  Out: 2602 [Urine:2052; Emesis/NG output:550]    Intake/Output  this shift:  No intake/output data recorded.    Yesterday's Weight:  Wt Readings from Last 1 Encounters:   03/08/24 121.4 kg (267 lb 10.2 oz)       SUBJECTIVE  The patient was agitated and tachypneic overnight.  The patient still required sedation.    ROS:  Negative except in subjective.    Neurological examination:  Examination is limited due to sedation and intubation.  There was no spontaneous movement or seizure-like activity during my assessment.    Labs and Imaging:  I reviewed labs and brain imaging.    50 minutes were spent with the patient with greater than 50% of the time spent in counseling and coordination of care.    Morgan Rodriguez MD

## 2024-03-08 NOTE — PROGRESS NOTES
03/08/24 1219   Patient Observation   Pulse 82   Respirations 19   SpO2 97 %   Breath Sounds   Right Upper Lobe Diminished;Clear   Right Middle Lobe Diminished   Right Lower Lobe Clear;Diminished   Left Upper Lobe Diminished   Left Lower Lobe Diminished   Vent Information   Equipment Changed HME   Vent Mode AC/VC   Ventilator Settings   FiO2  40 %   Vt (Set, mL) 450 mL   Resp Rate (Set) 18 bpm   PEEP/CPAP (cmH2O) 5   Vent Patient Data (Readings)   Vt (Measured) 477 mL   Peak Inspiratory Pressure (cmH2O) 18 cmH2O   Rate Measured 21 br/min   Minute Volume (L/min) 10.3 Liters   Mean Airway Pressure (cmH2O) 4.9 cmH20   Inspiratory Time 65 sec   I:E Ratio 1:2.5   Flow Sensitivity 3 L/min   Backup Apnea On   Backup Rate 18 Breaths Per Minute   Backup Vt 450   Vent Alarm Settings   High Pressure (cmH2O) 40 cmH2O   Low Minute Volume (lpm) 2 L/min   High Minute Volume (lpm) 24 L/min   Low Exhaled Vt (ml) 200 mL   High Exhaled Vt (ml) 1000 mL   Apnea (secs) 20 secs   Additional Respiratoray Assessments   Humidification Source HME   Circuit Condensation Drained   Ambu Bag With Mask At Bedside Yes   Airway Clearance   Suction ET Tube;Oral   Suction Device Inline suction catheter   Sputum Method Obtained Oral   Sputum Amount Small   Sputum Color/Odor   (creamy)   Sputum Consistency Thick   ETT    Placement Date/Time: 03/05/24 2033   Present on Admission/Arrival: No  Placed By: In ED  Location: Oral   Secured At 25 cm   Measured From Lips   ETT Placement Left   Secured By Commercial tube menon   Cuff Pressure 32 cm H2O   Tie/Menon Changed No   Ventilator Associated Pneumonia Bundle   Oral Care Performed Mouth suctioned   Respiratory   Respiratory Interventions Suction - ETT;Suction - oral

## 2024-03-08 NOTE — PROGRESS NOTES
03/07/24 1946   Patient Observation   Pulse 99   Respirations (!) 32   SpO2 97 %   Breath Sounds   Right Upper Lobe Clear   Right Middle Lobe Clear   Right Lower Lobe Diminished   Left Upper Lobe Clear   Left Lower Lobe Diminished   Vent Information   Vent Mode AC/VC   Ventilator Settings   FiO2  50 %   Vt (Set, mL) 450 mL   Resp Rate (Set) 20 bpm   PEEP/CPAP (cmH2O) 5   Vent Patient Data (Readings)   Vt (Measured) 764 mL   Peak Inspiratory Pressure (cmH2O) 23 cmH2O   Rate Measured 38 br/min   Minute Volume (L/min) 17.7 Liters   Mean Airway Pressure (cmH2O) 14 cmH20   Plateau Pressure (cm H2O) 0 cm H2O   Driving Pressure -5   I:E Ratio 1:1.30   Flow Sensitivity 3 L/min   Backup Apnea On   Vent Alarm Settings   High Pressure (cmH2O) 40 cmH2O   Low Minute Volume (lpm) 2 L/min   Low Exhaled Vt (ml) 200 mL   High Exhaled Vt (ml) 1000 mL   RR High (bpm) 45 br/min   Apnea (secs) 20 secs   Additional Respiratoray Assessments   Humidification Source HME   Ambu Bag With Mask At Bedside Yes   Airway Clearance   Suction Oral;ET Tube   Suction Device Simeon;Inline suction catheter   Sputum Method Obtained Endotracheal   Sputum Amount Scant   Sputum Color/Odor Clear;Pink tinged   Sputum Consistency Thick   ETT    Placement Date/Time: 03/05/24 2033   Present on Admission/Arrival: No  Placed By: In ED  Location: Oral   Secured At 25 cm   Measured From Lips   ETT Placement Right   Secured By Commercial tube kenny   Site Assessment Dry   Cuff Pressure 30 cm H2O

## 2024-03-08 NOTE — PLAN OF CARE
Problem: Discharge Planning  Goal: Discharge to home or other facility with appropriate resources  Outcome: Progressing  Flowsheets  Taken 3/7/2024 1600 by Donna Muro RN  Discharge to home or other facility with appropriate resources:   Identify barriers to discharge with patient and caregiver   Identify discharge learning needs (meds, wound care, etc)   Arrange for needed discharge resources and transportation as appropriate  Taken 3/7/2024 1200 by Donna Muro RN  Discharge to home or other facility with appropriate resources:   Identify barriers to discharge with patient and caregiver   Arrange for needed discharge resources and transportation as appropriate   Identify discharge learning needs (meds, wound care, etc)  Taken 3/7/2024 0800 by Donna Muro RN  Discharge to home or other facility with appropriate resources:   Identify barriers to discharge with patient and caregiver   Arrange for needed discharge resources and transportation as appropriate   Identify discharge learning needs (meds, wound care, etc)     Problem: Safety - Medical Restraint  Goal: Remains free of injury from restraints (Restraint for Interference with Medical Device)  Description: INTERVENTIONS:  1. Determine that other, less restrictive measures have been tried or would not be effective before applying the restraint  2. Evaluate the patient's condition at the time of restraint application  3. Inform patient/family regarding the reason for restraint  4. Q2H: Monitor safety, psychosocial status, comfort, nutrition and hydration  Outcome: Progressing     Problem: Pain  Goal: Verbalizes/displays adequate comfort level or baseline comfort level  Outcome: Progressing  Flowsheets  Taken 3/7/2024 1600 by Donna Muro RN  Verbalizes/displays adequate comfort level or baseline comfort level:   Encourage patient to monitor pain and request assistance   Assess pain using appropriate pain scale   Administer analgesics based on type and  obtain sitter and review sitter guidelines with assigned personnel  7. Initiate Psychosocial CNS and Spiritual Care consult, as indicated  Outcome: Progressing  Flowsheets  Taken 3/7/2024 1600 by Donna Muro RN  Effect of thought disturbance (confusion, delirium, dementia, or psychosis) are managed with adequate functional status:   Assess for contributors to thought disturbance, including medications, impaired vision or hearing, underlying metabolic abnormalities, dehydration, psychiatric diagnoses, notify LIP   Provide frequent short contacts to provide reality reorientation, refocusing and direction   Moca high risk fall precautions, as indicated  Taken 3/7/2024 1200 by Donna Muro RN  Effect of thought disturbance (confusion, delirium, dementia, or psychosis) are managed with adequate functional status:   Assess for contributors to thought disturbance, including medications, impaired vision or hearing, underlying metabolic abnormalities, dehydration, psychiatric diagnoses, notify LIP   Moca high risk fall precautions, as indicated   Provide frequent short contacts to provide reality reorientation, refocusing and direction  Taken 3/7/2024 0800 by Donna Muro RN  Effect of thought disturbance (confusion, delirium, dementia, or psychosis) are managed with adequate functional status:   Assess for contributors to thought disturbance, including medications, impaired vision or hearing, underlying metabolic abnormalities, dehydration, psychiatric diagnoses, notify LIP   Moca high risk fall precautions, as indicated   Provide frequent short contacts to provide reality reorientation, refocusing and direction

## 2024-03-08 NOTE — PROGRESS NOTES
03/08/24 0401   Patient Observation   Pulse 100   Respirations 18   SpO2 95 %   Breath Sounds   Right Upper Lobe Clear   Right Middle Lobe Clear   Right Lower Lobe Diminished   Left Upper Lobe Clear   Left Lower Lobe Diminished   Vent Information   Vent Mode AC/VC   Ventilator Settings   FiO2  50 %   Vt (Set, mL) 450 mL   Resp Rate (Set) 20 bpm   PEEP/CPAP (cmH2O) 5   Vent Patient Data (Readings)   Vt (Measured) 509 mL   Peak Inspiratory Pressure (cmH2O) 17 cmH2O   Rate Measured 21 br/min   Minute Volume (L/min) 10.3 Liters   Mean Airway Pressure (cmH2O) 8.4 cmH20   Plateau Pressure (cm H2O) 0 cm H2O   Driving Pressure -5   I:E Ratio 1:2.70   Flow Sensitivity 3 L/min   Backup Apnea On   Vent Alarm Settings   High Pressure (cmH2O) 40 cmH2O   Low Minute Volume (lpm) 2 L/min   Low Exhaled Vt (ml) 200 mL   High Exhaled Vt (ml) 1000 mL   RR High (bpm) 65 br/min   Apnea (secs) 20 secs   Additional Respiratoray Assessments   Humidification Source HME   Ambu Bag With Mask At Bedside Yes   Airway Clearance   Suction ET Tube   Suction Device Inline suction catheter   Sputum Method Obtained Endotracheal   Sputum Amount Moderate   Sputum Color/Odor Tan;Pink tinged   Sputum Consistency Thick   ETT    Placement Date/Time: 03/05/24 2033   Present on Admission/Arrival: No  Placed By: In ED  Location: Oral   Secured At 25 cm   Measured From Lips   ETT Placement Left   Secured By Commercial tube kenny   Site Assessment Dry   Cuff Pressure 30 cm H2O

## 2024-03-08 NOTE — PROGRESS NOTES
Cezar Arora admitted 3/5/2024  8:36 PM FOR Cardiac arrest (HCC)    Patient seen and examined today during multidisciplinary ICU rounds.  Found unresponsive by family outside while preparing dinner, bystander CPR and EMS notified, downtime 10-15 minutes.  Intubated, on propofol/fentanyl    Past Medical History:   Diagnosis Date    Asthma     High blood pressure     Mononucleosis 5/2016    Sleep apnea       Past Surgical History:   Procedure Laterality Date    CHOLECYSTECTOMY  6/10/16    LAPAROSCOPIC CHOLECYSTECTOMY WITH CHOLANGIOGRAM    OTHER SURGICAL HISTORY      vein in testicle        BP (!) 146/93   Pulse 76   Temp 97.4 °F (36.3 °C) (Bladder)   Resp 18   Ht 1.88 m (6' 2.02\")   Wt 121.4 kg (267 lb 10.2 oz)   SpO2 96%   BMI 34.35 kg/m²     CVC Triple Lumen 03/05/24 Right Femoral (Active)   Placement Date/Time: 03/05/24 2256   Inserted by: BP MD  Orientation: Right  Location: Femoral       ETT  (Active)   Placement Date/Time: 03/05/24 2033   Present on Admission/Arrival: No  Placed By: In ED  Location: Oral       Plan:  MRI yesterday with diffuse cerebral edema/anoxic injury. Results discussed with family.  Remains intubated, sedation off for 8 hours yesterday but restarted d/t fever, tachycardia.tachypnea  Neurologically unchanged. Extensive conversations with family today, leaning toward terminal extubation and comfort measures.  Sedation changed to versed/fentanyl and propofol weaned off.  Continues to initiate breathing over the ventilator with sedation.    Discussed with RN at the bedside.      Lisa Gonsalez, APRN-CNP

## 2024-03-08 NOTE — PROGRESS NOTES
03/08/24 1546   Patient Observation   Pulse 76   Respirations 18   SpO2 96 %   Breath Sounds   Right Upper Lobe Clear   Right Middle Lobe Clear   Right Lower Lobe Diminished   Left Upper Lobe Clear   Left Lower Lobe Diminished   Vent Information   Vent Mode AC/VC   Ventilator Settings   FiO2  (S)  35 %  (RT found on)   Vt (Set, mL) 450 mL   Resp Rate (Set) 18 bpm   PEEP/CPAP (cmH2O) 5   Vent Patient Data (Readings)   Vt (Measured) 485 mL   Peak Inspiratory Pressure (cmH2O) 18 cmH2O   Rate Measured 18 br/min   Minute Volume (L/min) 9.54 Liters   Mean Airway Pressure (cmH2O) 6.7 cmH20   I:E Ratio 1:3.5   Flow Sensitivity 3 L/min   Backup Apnea On   Vent Alarm Settings   High Pressure (cmH2O) 40 cmH2O   Low Minute Volume (lpm) 2 L/min   Low Exhaled Vt (ml) 200 mL   High Exhaled Vt (ml) 1000 mL   RR High (bpm) 65 br/min   Apnea (secs) 20 secs   Additional Respiratoray Assessments   Humidification Source HME   Ambu Bag With Mask At Bedside Yes   ETT    Placement Date/Time: 03/05/24 2033   Present on Admission/Arrival: No  Placed By: In ED  Location: Oral   Secured At 25 cm   Measured From Lips   ETT Placement Right   Secured By Commercial tube kenny   Site Assessment Dry   Cuff Pressure 30 cm H2O

## 2024-03-08 NOTE — CARE COORDINATION
LOS day 2- Pt. Remains on the vent 40%/peep 5. Neuro  has signed off. Ethics has met with the family. This afternoon the mother  will be the decision maker to possibly w/d care. Providing/ following needs of family.

## 2024-03-08 NOTE — CONSULTS
Clinical Ethics Consultation Note    History and Context:  Sudden collapse, found by daughter and rosa. Rosa attempted resuscitation and brought him to ED. Patient also has a living parent. His brother and father are .    Principal Problem:    Cardiac arrest (HCC)  Active Problems:    Lactic acidosis    Elevated troponin    Acute respiratory failure with hypoxia (HCC)  Resolved Problems:    * No resolved hospital problems. *    Age: 43 y.o.  Gender: male  Gender Identity: male  Admitted Date: 3/5/2024  Consult Date: 24  MRN: [id number]  Valid Advanced Medical Directive: Yes    Process:  Chart review; care conference with Lisa (NP), Dr. Rincon, and Haley (bedside RN); discussion with rosa (~30 min)    Ethical Question(s) and Concern(s):    We have two ethical questions.  Who is the appropriate decision maker?  How should we involve [facility]?    Analysis:    Question 1:     The fiancee (Aster) said that Migdalia (pt's mom) has some signs of dementia but is still independent. Dr. Rincon also recognized that Migdalia has the ability to engage in conversation and process information. Since Migdalia has decision making capacity and is the legal NOK, she is the legal surrogate. She is the ultimate decision maker. I explained to Aster that legally we must look to Migdalia for the ultimate decision. Aster can support Migdalia, and Migdalia may defer to her. Aster is not the legal surrogate, though. Regardless of who is the legal surrogate, we should look for a substituted judgment--a true a representation of the patient's wishes rather than a personal choice from the surrogate. Aster expressed confidence in providing a substituted judgment, saying \"I know he would not want this. He went through this with his brother for five days and said he hated seeing him like that.\" The direction seems to be clear, but the emotional difficulty of authorizing it is creating some moral distress for this family.

## 2024-03-09 NOTE — PROGRESS NOTES
Patient found to be without pulse and respirations at 2032. Verified with second RN, Ofelia Burk.     Family at bedside, grief support given.     Electronically signed by Kasie Heck RN on 3/8/2024 at 8:35 PM

## 2024-03-09 NOTE — PROGRESS NOTES
Patient terminally extubated to room air at 2003 with respiratory therapist at bedside. Comfort medications ordered by MD, will administer as needed.     Electronically signed by Kasie Heck RN on 3/8/2024 at 8:08 PM

## 2024-03-09 NOTE — PROGRESS NOTES
24   Encounter Summary   Encounter Overview/Reason  Spiritual/Emotional Needs;Rituals, Rites and Sacraments;Grief, Loss, and Adjustments;Follow-up   Service Provided For: Family   Referral/Consult From: [facility]   Support System Significant other;Parent;Family members;Friends/neighbors   Last Encounter  24   Complexity of Encounter High   Begin Time 2030  (Follow up visit in memorium for the .)   End Time  2100   Total Time Calculated 30 min   Crisis   Type Emotional distress;Family Care;Follow up   Spiritual/Emotional needs   Type Spiritual Support;Emotional Distress;Difficult news received   Rituals, Rites and Sacraments   Type Blessings  (Prayer of blessing for the  and surviving family and friends.)   Grief, Loss, and Adjustments   Type End of Life;Death;Grief and loss;Bereavement Care   Assessment/Intervention/Outcome   Assessment Compromised coping;Despair;Impaired resilience;Interrupted family processes;Questioning alea;Powerlessness;Questioning meaning and purpose;Sad;Shock;Tearful   Intervention Active listening;End of Life Care;Prayer (assurance of)/Valier;Sustaining Presence/Ministry of presence;Other (Comment)  (Provided patient's fiance with [facility] contact information regarding grief counseling for her daughter. Provided Landen quiñonez to patient's mother.)   Outcome Comfort;Coping;Engaged in conversation;Expressed feelings, needs, and concerns;Grieving;Receptive   Plan and Referrals   Plan/Referrals No future visits requested  (Follow up PRN as requested by family or staff.)

## 2024-03-09 NOTE — PROGRESS NOTES
Informed [facility] that family does not want to proceed with any form of organ donation. Family wants to move forward with extubation and comfort care measures at this time.

## 2024-03-09 NOTE — PROGRESS NOTES
New telephone orders were placed for DNR-CC, extubation, and comfort care medications per Dr. Rincon.

## 2024-03-09 NOTE — PROGRESS NOTES
Body released from [facility] center, no autopsy requested, and confirmed release with . Post mortem care provided, all lines removed.     Patient transported to [facility] at 2236    Electronically signed by Kasie Heck RN on 3/8/2024 at 10:37 PM

## 2024-03-09 NOTE — PROGRESS NOTES
concerns;Grieving;Peace;Receptive   Plan and Referrals   Plan/Referrals Continue to visit, (comment)  (Continue to monitor over the course of the evening. Follow up PRN as requested by family or staff. Patient has 9-year-old daughter. Informed his fiance that I would provide contact information for [facility] if needed.)

## 2024-03-11 NOTE — DISCHARGE SUMMARY
Hospital Medicine Discharge Summary    Patient: Cezar Arora   : 1980     Admit Date: 3/5/2024   Discharge Date: 3/8/2024    Disposition:  [x]Morgue   []HHC  []SNF  []Acute Rehab  []LTAC  []Hospice  Code status:  []Full  []DNR/CCA  []Limited (DNR/CCA with Do Not Intubate)  [x]DNRCC  Condition at Discharge:   Primary Care Provider: Bucky De La Rosa PA-C    Admitting Provider: Jaycee Myles MD  Discharge Provider: Neal Tapia MD     Discharge Diagnoses:      Active Hospital Problems    Diagnosis     Cardiac arrest (HCC) [I46.9]     Lactic acidosis [E87.20]     Elevated troponin [R79.89]     Acute respiratory failure with hypoxia (HCC) [J96.01]        Presenting Admission History:          \"Cezar Arora is a 43 y.o. male with pmh of hypertension and hyperlipidemia who was brought into the emergency room via EMS after a cardiac arrest.  History obtained by the patient's fiancé at the bedside reports that he had endorsed a left flank cramping earlier in the day but did not think much of it.  The patient had not endorsed any additional complaints to his family.  He was cooking on the [employer] outside when the patient's kids found him unresponsive on the ground.  The patient's fiancé noticed that he was gray and not breathing and their wife started CPR.  Upon EMS arrival, the patient was noted to be in cardiac arrest and roughly 20 minutes of CPR was performed.  Upon arrival at the emergency room, the patient again was noted to be pulseless and further CPR was performed for roughly 2 minutes.  The patient was intubated and admitted to the medical ICU.     In the emergency room CT head and cervical spine was concerning for diffuse anoxic brain injury with a posterior scalp hematoma noted.  CT chest revealed minimally displaced anterior rib fractures involving the third to the seventh ribs on the right and third to the sixth rib on the left.  CT abdomen and pelvis with contrast revealed no abnormalities.   Non-focal  Psychiatric:  Alert and oriented    Patient Discharge Instructions:      Follow up:    1.  Primary Care Provider Bucky De La Rosa PA-C in the next 1-2 weeks.      The patient was seen and examined on day of discharge and this discharge summary is in conjunction with any daily progress note from day of discharge. Time spent on discharge: 32 minutes in the examination, evaluation, counseling and review of medications and discharge plan.    ------------------------------------------------------------------------------------------------------------------------------------------------------    Discharge Medications:   Discharge Medication List as of 3/8/2024 10:40 PM        Discharge Medication List as of 3/8/2024 10:40 PM        Discharge Medication List as of 3/8/2024 10:40 PM        CONTINUE these medications which have NOT CHANGED    Details   valsartan (DIOVAN) 160 MG tablet Take 1 tablet by mouth dailyHistorical Med      albuterol sulfate HFA (VENTOLIN HFA) 108 (90 Base) MCG/ACT inhaler Inhale 2 puffs into the lungs every 4 hours as needed for WheezingHistorical Med      apixaban (ELIQUIS) 5 MG TABS tablet Take 1 tablet by mouth 2 times dailyHistorical Med      atorvastatin (LIPITOR) 20 MG tablet Take 1 tablet by mouth dailyHistorical Med      ketoconazole (NIZORAL) 2 % cream Apply topically daily Apply topically daily for 2-6 weeks (to affected skin), Topical, DAILY, Historical Med      sildenafil (VIAGRA) 50 MG tablet Take 1 tablet by mouth as needed for Erectile Dysfunction, R-5, DAWHistorical Med      clonazePAM (KLONOPIN) 1 MG tablet Take 1 tablet by mouth 2 times daily as needed for Anxiety, Disp-12 tablet, R-0      aspirin 325 MG EC tablet Take 325 mg by mouth daily      BYSTOLIC 10 MG tablet Take 10 mg by mouth daily            Discharge Medication List as of 3/8/2024 10:40 PM        STOP taking these medications       valsartan-hydrochlorothiazide (DIOVAN HCT) 320-25 MG per tablet Comments:

## 2024-03-11 NOTE — PROGRESS NOTES
Required reporting for death within 7days of removal of 2 point soft wrist restraints completed- added to facility database.